# Patient Record
Sex: FEMALE | Race: WHITE | Employment: OTHER | URBAN - METROPOLITAN AREA
[De-identification: names, ages, dates, MRNs, and addresses within clinical notes are randomized per-mention and may not be internally consistent; named-entity substitution may affect disease eponyms.]

---

## 2021-09-14 ENCOUNTER — TRANSCRIBE ORDER (OUTPATIENT)
Dept: SCHEDULING | Age: 71
End: 2021-09-14

## 2021-09-14 DIAGNOSIS — Z78.0 ASYMPTOMATIC MENOPAUSAL STATE: Primary | ICD-10-CM

## 2021-09-14 DIAGNOSIS — Z12.31 ENCOUNTER FOR SCREENING MAMMOGRAM FOR MALIGNANT NEOPLASM OF BREAST: ICD-10-CM

## 2021-10-01 ENCOUNTER — HOSPITAL ENCOUNTER (OUTPATIENT)
Dept: GENERAL RADIOLOGY | Age: 71
End: 2021-10-01
Attending: NURSE PRACTITIONER
Payer: MEDICARE

## 2021-10-08 ENCOUNTER — APPOINTMENT (OUTPATIENT)
Dept: GENERAL RADIOLOGY | Age: 71
End: 2021-10-08
Attending: NURSE PRACTITIONER
Payer: MEDICARE

## 2021-10-08 ENCOUNTER — HOSPITAL ENCOUNTER (OUTPATIENT)
Dept: MAMMOGRAPHY | Age: 71
Discharge: HOME | End: 2021-10-08
Attending: NURSE PRACTITIONER

## 2021-10-08 ENCOUNTER — HOSPITAL ENCOUNTER (OUTPATIENT)
Dept: GENERAL RADIOLOGY | Age: 71
Discharge: HOME | End: 2021-10-08
Attending: NURSE PRACTITIONER
Payer: MEDICARE

## 2021-10-08 VITALS — WEIGHT: 160 LBS | BODY MASS INDEX: 29.26 KG/M2

## 2021-10-08 DIAGNOSIS — Z78.0 ASYMPTOMATIC MENOPAUSAL STATE: ICD-10-CM

## 2021-10-08 DIAGNOSIS — Z12.31 ENCOUNTER FOR SCREENING MAMMOGRAM FOR MALIGNANT NEOPLASM OF BREAST: ICD-10-CM

## 2021-10-08 PROCEDURE — 77063 BREAST TOMOSYNTHESIS BI: CPT

## 2021-10-08 PROCEDURE — 77080 DXA BONE DENSITY AXIAL: CPT

## 2022-03-10 ENCOUNTER — TELEPHONE (OUTPATIENT)
Dept: NEUROLOGY | Age: 72
End: 2022-03-10

## 2022-03-10 NOTE — TELEPHONE ENCOUNTER
Pt states she received a call from our office yesterday to set up NP appt. I got her scheduled for 8/29. Pt wants to know what she needs to be seen for. Please locate paperwork faxed from Dr. Doris Tavarez and call back to explain. [phone]    Sending to all psr's since pt did not know who called her.

## 2022-03-16 ENCOUNTER — OFFICE VISIT (OUTPATIENT)
Dept: NEUROLOGY | Age: 72
End: 2022-03-16
Payer: MEDICARE

## 2022-03-16 DIAGNOSIS — M54.12 CERVICAL RADICULOPATHY: ICD-10-CM

## 2022-03-16 PROCEDURE — 95913 NRV CNDJ TEST 13/> STUDIES: CPT | Performed by: PSYCHIATRY & NEUROLOGY

## 2022-03-16 PROCEDURE — 95886 MUSC TEST DONE W/N TEST COMP: CPT | Performed by: PSYCHIATRY & NEUROLOGY

## 2022-03-16 NOTE — PROCEDURES
ELECTRODIAGNOSTIC REPORT      Test Date:  3/16/2022    Patient: Angel Bliss : 1950 Physician: Dr. Kamlesh Kamara   ID#: [id number] SEX: Female Ref. Phys: Dr. Marilou Hill     Patient History / Exam:  The patient is a pleasant 70year old female who presents with sensory disturbance and pain in her bilateral upper extremities. She has a history of prior cervical spine injury. Strength was 5/5 throughout. EMG & NCV Findings:    Nerve conduction studies as listed below in the bilateral upper extremities  were within reference of normal.    Disposable concentric needle examination of the muscles listed below revealed no evidence of active denervation. There were chronic neurogenic appearing motor units in the left deltoid, biceps, and bilateral triceps. Interpretation: This study was abnormal.  There was  electrodiagnostic evidence upon todays examination suggestin. A chronic mild cervical radiculopathy with no evidence of active denervation. 2. There was no evidence of  a focal or generalized large fiber neuropathy or myopathy      ___________________________  Michelle ANDINO   FAAN      Nerve Conduction Studies  Anti Sensory Summary Table     Stim Site NR Onset (ms) Peak (ms) O-P Amp (µV) Norm Peak (ms) Norm O-P Amp Site1 Site2 Dist (cm) Norm Loyd (m/s)   Left Median Anti Sensory (2nd Digit)  32.9°C   Wrist    2.3 2.9 12.3 <4 >11 Wrist 2nd Digit 14.0    Right Median Anti Sensory (2nd Digit)  32.9°C   Wrist    2.4 3.1 24.0 <4 >11 Wrist 2nd Digit 14.0    Left Radial Anti Sensory (Base 1st Digit)  32.9°C   Wrist    1.4 2.2 15.8 <2.9 >15 Wrist Base 1st Digit 10.0    Right Radial Anti Sensory (Base 1st Digit)  32.9°C   Wrist    1.8 2.4 19.0 <2.9 >15 Wrist Base 1st Digit 10.0    Left Ulnar Anti Sensory (5th Digit)  32.9°C   Wrist    2.2 2.9 13.3 <4.0 >10 Wrist 5th Digit 14.0    Right Ulnar Anti Sensory (5th Digit)  32.9°C   Wrist    1.9 2.6 17.3 <4.0 >10 Wrist 5th Digit 14.0      Motor Summary Table     Stim Site NR Onset (ms) Norm Onset (ms) O-P Amp (mV) Norm O-P Amp P-T Amp (mV) Site1 Site2 Dist (cm) Loyd (m/s)   Left Median Motor (Abd Poll Brev)  32.9°C   Wrist    3.4 <4.5 4.1 >4.1  Wrist Abd Poll Brev 8.0    Elbow    7.0  3.7   Elbow Wrist 19.0 53   Right Median Motor (Abd Poll Brev)  32.9°C   Wrist    3.7 <4.5 4.2 >4.1  Wrist Abd Poll Brev 8.0    Elbow    7.1  4.3   Elbow Wrist 17.0 50   Left Ulnar Motor (Abd Dig Minimi)  32.9°C   Wrist    2.8 <3.1 7.1 >7.0  Wrist Abd Dig Minimi 8.0    B Elbow    5.8  6.9   B Elbow Wrist 19.0 63   A Elbow    7.7  6.8   A Elbow B Elbow 10.0 53   Right Ulnar Motor (Abd Dig Minimi)  32.9°C   Wrist    3.0 <3.1 7.7 >7.0  Wrist Abd Dig Minimi 8.0    B Elbow    6.0  7.6   B Elbow Wrist 18.0 60   A Elbow    7.7  7.2   A Elbow B Elbow 10.0 59     Comparison Summary Table     Stim Site NR Peak (ms) P-T Amp (µV) Site1 Site2 Dist (cm) Delta-P (ms)   Left Median/Ulnar Palm Comparison (Wrist)  32.9°C   Median Palm    1.8 25.6 Median Palm Ulnar Palm 8.0 0.1   Ulnar Palm    1.7 9.5       Right Median/Ulnar Palm Comparison (Wrist)  32.9°C   Median Palm    2.1 28.6 Median Palm Ulnar Palm 8.0 0.3   Ulnar Palm    1.8 19.9           EMG     Side Muscle Nerve Root Ins Act Fibs Psw Recrt Duration Amp Poly Comment   Left Deltoid Axillary C5-6 Nml Nml Nml Reduced Nml Incr Nml    Left Triceps Radial C6-7-8 Nml Nml Nml Reduced Nml Nml Nml    Left Biceps Musculocut C5-6 Nml Nml Nml Reduced Nml Incr Nml    Left 1stDorInt Ulnar C8-T1 Nml Nml Nml Nml Nml Nml Nml    Left Abd Poll Brev Median C8-T1 Nml Nml Nml Nml Nml Nml Nml    Right Deltoid Axillary C5-6 Nml Nml Nml Nml Nml Incr Nml    Right Triceps Radial C6-7-8 Nml Nml Nml Reduced Nml Nml Nml    Right Biceps Musculocut C5-6 Nml Nml Nml Nml Nml Nml 1+    Right 1stDorInt Ulnar C8-T1 Nml Nml Nml Nml Nml Nml Nml    Right Abd Poll Brev Median C8-T1 Nml Nml Nml Nml Nml Nml Nml    Right Upper Cerv Parasp Rami C5,6 Nml Nml Nml Nml Nml Nml Nml Waveforms:

## 2022-03-22 ENCOUNTER — HOSPITAL ENCOUNTER (OUTPATIENT)
Dept: GENERAL RADIOLOGY | Age: 72
Discharge: HOME | End: 2022-03-22
Payer: MEDICARE

## 2022-03-22 ENCOUNTER — TRANSCRIBE ORDER (OUTPATIENT)
Dept: REGISTRATION | Age: 72
End: 2022-03-22

## 2022-03-22 DIAGNOSIS — M54.2 CERVICALGIA: Primary | ICD-10-CM

## 2022-03-22 DIAGNOSIS — M54.2 CERVICALGIA: ICD-10-CM

## 2022-03-22 PROCEDURE — 72050 X-RAY EXAM NECK SPINE 4/5VWS: CPT

## 2022-03-24 ENCOUNTER — TRANSCRIBE ORDER (OUTPATIENT)
Dept: SCHEDULING | Age: 72
End: 2022-03-24

## 2022-03-24 DIAGNOSIS — M79.641 RIGHT HAND PAIN: Primary | ICD-10-CM

## 2022-03-24 DIAGNOSIS — M79.642 LEFT HAND PAIN: ICD-10-CM

## 2022-03-24 DIAGNOSIS — M54.2 CERVICALGIA: ICD-10-CM

## 2022-03-30 ENCOUNTER — HOSPITAL ENCOUNTER (OUTPATIENT)
Dept: MRI IMAGING | Age: 72
Discharge: HOME | End: 2022-03-30
Attending: ORTHOPAEDIC SURGERY
Payer: MEDICARE

## 2022-03-30 DIAGNOSIS — M54.2 CERVICALGIA: ICD-10-CM

## 2022-03-30 DIAGNOSIS — M79.641 RIGHT HAND PAIN: ICD-10-CM

## 2022-03-30 DIAGNOSIS — M79.642 LEFT HAND PAIN: ICD-10-CM

## 2022-03-30 PROCEDURE — 72141 MRI NECK SPINE W/O DYE: CPT

## 2022-06-07 ENCOUNTER — APPOINTMENT (OUTPATIENT)
Dept: GENERAL RADIOLOGY | Age: 72
DRG: 472 | End: 2022-06-07
Attending: ORTHOPAEDIC SURGERY
Payer: MEDICARE

## 2022-06-07 ENCOUNTER — HOSPITAL ENCOUNTER (OUTPATIENT)
Dept: PREADMISSION TESTING | Age: 72
Discharge: HOME | DRG: 472 | End: 2022-06-07
Payer: MEDICARE

## 2022-06-07 VITALS
RESPIRATION RATE: 16 BRPM | TEMPERATURE: 98.1 F | BODY MASS INDEX: 27.38 KG/M2 | SYSTOLIC BLOOD PRESSURE: 135 MMHG | WEIGHT: 148.81 LBS | HEART RATE: 80 BPM | HEIGHT: 62 IN | OXYGEN SATURATION: 100 % | DIASTOLIC BLOOD PRESSURE: 66 MMHG

## 2022-06-07 LAB
ABO + RH BLD: NORMAL
ALBUMIN SERPL-MCNC: 4.2 G/DL (ref 3.5–5)
ALBUMIN/GLOB SERPL: 1 {RATIO} (ref 1.1–2.2)
ALP SERPL-CCNC: 69 U/L (ref 45–117)
ALT SERPL-CCNC: 27 U/L (ref 12–78)
AMPHET UR QL SCN: NEGATIVE
ANION GAP SERPL CALC-SCNC: 2 MMOL/L (ref 5–15)
APPEARANCE UR: CLEAR
AST SERPL-CCNC: 17 U/L (ref 15–37)
ATRIAL RATE: 78 BPM
BACTERIA URNS QL MICRO: ABNORMAL /HPF
BARBITURATES UR QL SCN: NEGATIVE
BENZODIAZ UR QL: NEGATIVE
BILIRUB SERPL-MCNC: 0.3 MG/DL (ref 0.2–1)
BILIRUB UR QL: NEGATIVE
BLOOD GROUP ANTIBODIES SERPL: NORMAL
BUN SERPL-MCNC: 35 MG/DL (ref 6–20)
BUN/CREAT SERPL: 36 (ref 12–20)
CALCIUM SERPL-MCNC: 9.6 MG/DL (ref 8.5–10.1)
CALCULATED P AXIS, ECG09: 76 DEGREES
CALCULATED R AXIS, ECG10: -4 DEGREES
CALCULATED T AXIS, ECG11: 32 DEGREES
CANNABINOIDS UR QL SCN: NEGATIVE
CHLORIDE SERPL-SCNC: 107 MMOL/L (ref 97–108)
CO2 SERPL-SCNC: 31 MMOL/L (ref 21–32)
COCAINE UR QL SCN: NEGATIVE
COLOR UR: ABNORMAL
CREAT SERPL-MCNC: 0.96 MG/DL (ref 0.55–1.02)
DIAGNOSIS, 93000: NORMAL
DRUG SCRN COMMENT,DRGCM: NORMAL
EPITH CASTS URNS QL MICRO: ABNORMAL /LPF
ERYTHROCYTE [DISTWIDTH] IN BLOOD BY AUTOMATED COUNT: 13.5 % (ref 11.5–14.5)
GLOBULIN SER CALC-MCNC: 4.4 G/DL (ref 2–4)
GLUCOSE SERPL-MCNC: 123 MG/DL (ref 65–100)
GLUCOSE UR STRIP.AUTO-MCNC: NEGATIVE MG/DL
HCT VFR BLD AUTO: 36.7 % (ref 35–47)
HGB BLD-MCNC: 12.4 G/DL (ref 11.5–16)
HGB UR QL STRIP: NEGATIVE
HYALINE CASTS URNS QL MICRO: ABNORMAL /LPF (ref 0–2)
INR PPP: 1 (ref 0.9–1.1)
KETONES UR QL STRIP.AUTO: NEGATIVE MG/DL
LEUKOCYTE ESTERASE UR QL STRIP.AUTO: NEGATIVE
MCH RBC QN AUTO: 30.1 PG (ref 26–34)
MCHC RBC AUTO-ENTMCNC: 33.8 G/DL (ref 30–36.5)
MCV RBC AUTO: 89.1 FL (ref 80–99)
METHADONE UR QL: NEGATIVE
NITRITE UR QL STRIP.AUTO: NEGATIVE
NRBC # BLD: 0 K/UL (ref 0–0.01)
NRBC BLD-RTO: 0 PER 100 WBC
OPIATES UR QL: NEGATIVE
P-R INTERVAL, ECG05: 186 MS
PCP UR QL: NEGATIVE
PH UR STRIP: 5 [PH] (ref 5–8)
PLATELET # BLD AUTO: 252 K/UL (ref 150–400)
PMV BLD AUTO: 10.1 FL (ref 8.9–12.9)
POTASSIUM SERPL-SCNC: 3.6 MMOL/L (ref 3.5–5.1)
PROT SERPL-MCNC: 8.6 G/DL (ref 6.4–8.2)
PROT UR STRIP-MCNC: NEGATIVE MG/DL
PROTHROMBIN TIME: 10.8 SEC (ref 9–11.1)
Q-T INTERVAL, ECG07: 384 MS
QRS DURATION, ECG06: 86 MS
QTC CALCULATION (BEZET), ECG08: 437 MS
RBC # BLD AUTO: 4.12 M/UL (ref 3.8–5.2)
RBC #/AREA URNS HPF: ABNORMAL /HPF (ref 0–5)
SODIUM SERPL-SCNC: 140 MMOL/L (ref 136–145)
SP GR UR REFRACTOMETRY: 1.02
SPECIMEN EXP DATE BLD: NORMAL
UA: UC IF INDICATED,UAUC: ABNORMAL
UROBILINOGEN UR QL STRIP.AUTO: 0.2 EU/DL (ref 0.2–1)
VENTRICULAR RATE, ECG03: 78 BPM
WBC # BLD AUTO: 8.3 K/UL (ref 3.6–11)
WBC URNS QL MICRO: ABNORMAL /HPF (ref 0–4)

## 2022-06-07 PROCEDURE — 36415 COLL VENOUS BLD VENIPUNCTURE: CPT

## 2022-06-07 PROCEDURE — 71046 X-RAY EXAM CHEST 2 VIEWS: CPT

## 2022-06-07 PROCEDURE — 85610 PROTHROMBIN TIME: CPT

## 2022-06-07 PROCEDURE — 85027 COMPLETE CBC AUTOMATED: CPT

## 2022-06-07 PROCEDURE — 97161 PT EVAL LOW COMPLEX 20 MIN: CPT | Performed by: PHYSICAL THERAPIST

## 2022-06-07 PROCEDURE — 97530 THERAPEUTIC ACTIVITIES: CPT | Performed by: PHYSICAL THERAPIST

## 2022-06-07 PROCEDURE — 93005 ELECTROCARDIOGRAM TRACING: CPT

## 2022-06-07 PROCEDURE — 86900 BLOOD TYPING SEROLOGIC ABO: CPT

## 2022-06-07 PROCEDURE — 80307 DRUG TEST PRSMV CHEM ANLYZR: CPT

## 2022-06-07 PROCEDURE — 84134 ASSAY OF PREALBUMIN: CPT

## 2022-06-07 PROCEDURE — 83036 HEMOGLOBIN GLYCOSYLATED A1C: CPT

## 2022-06-07 PROCEDURE — 82306 VITAMIN D 25 HYDROXY: CPT

## 2022-06-07 PROCEDURE — 81001 URINALYSIS AUTO W/SCOPE: CPT

## 2022-06-07 PROCEDURE — 80053 COMPREHEN METABOLIC PANEL: CPT

## 2022-06-07 RX ORDER — MENTHOL
1000 GEL (GRAM) TOPICAL DAILY
COMMUNITY

## 2022-06-07 RX ORDER — ZINC GLUCONATE 10 MG
1 LOZENGE ORAL DAILY
COMMUNITY

## 2022-06-07 RX ORDER — CYCLOBENZAPRINE HCL 10 MG
10 TABLET ORAL
COMMUNITY

## 2022-06-07 RX ORDER — MONTELUKAST SODIUM 10 MG/1
10 TABLET ORAL DAILY
COMMUNITY

## 2022-06-07 RX ORDER — ALENDRONATE SODIUM 70 MG/1
70 TABLET ORAL
COMMUNITY

## 2022-06-07 RX ORDER — PHENOL/SODIUM PHENOLATE
20 AEROSOL, SPRAY (ML) MUCOUS MEMBRANE DAILY
COMMUNITY

## 2022-06-07 RX ORDER — TRAMADOL HYDROCHLORIDE 50 MG/1
50 TABLET ORAL
COMMUNITY
End: 2022-06-15

## 2022-06-07 RX ORDER — ERGOCALCIFEROL 1.25 MG/1
50000 CAPSULE ORAL
COMMUNITY

## 2022-06-07 RX ORDER — MELATONIN 2.5MG/10ML
1 LIQUID (ML) ORAL DAILY
COMMUNITY

## 2022-06-07 RX ORDER — CETIRIZINE HCL 10 MG
10 TABLET ORAL DAILY
COMMUNITY

## 2022-06-07 RX ORDER — LISINOPRIL AND HYDROCHLOROTHIAZIDE 10; 12.5 MG/1; MG/1
1 TABLET ORAL DAILY
COMMUNITY

## 2022-06-07 RX ORDER — MELOXICAM 15 MG/1
15 TABLET ORAL DAILY
COMMUNITY
End: 2022-06-15

## 2022-06-07 NOTE — PROGRESS NOTES
[facility]  Physical Therapy Pre-surgery evaluation  [address], [address]    PHYSICAL THERAPY PRE SPINE SURGERY EVALUATION  Patient: Vivek Arteaga (68 y.o. 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 Patient reports injury to spine in 1991 and numbness that started in fingers in 2001. In Feb 2022 patient began to have increased numbness in hands and decreased fine motor coordination which is significantly impacting her ADLs. Gait is also unsteady demonstrating path deviations and fair overall balance. Discussed anticipated disposition to home with possible discharge within a 1 to 2 day time frame post-surgery. Patient and  in agreement. Patient appear anxious regarding upcoming surgery with many questions regarding recovery. Will likely benefit from hand therapy following discharge     Anticipate patient will need acute PT and OT orders based on current and anticipate deficits post surgery. GOALS: (Goals have been discussed and agreed upon with patient.)  DISCHARGE GOALS: Time Frame: 1 DAY  1. Patient will demonstrate increased strength, range of motion, and pain control via a home exercise program in order to minimize functional deficits in preparation for their upcoming surgery.  This will be achieved by using education, demonstration  and through the use of an informational handout including a home exercise program.  REHABILITATION POTENTIAL FOR STATED GOALS: Good     RECOMMENDATIONS AND PLANNED INTERVENTIONS: (Benefits and precautions of physical therapy have been discussed with the patient.)  · Home Exercise Program  TREATMENT PLAN EFFECTIVE DATES: 6/7/2022 to 6/7/2022  FREQUENCY/DURATION: Patient to continue to perform home exercise program at least twice daily until surgery. SUBJECTIVE:   Patient stated For the longest time I thought my numbness was from carpal tunnel.     OBJECTIVE DATA SUMMARY:   HISTORY:      Past Medical History:   Diagnosis Date    Menopause      Past Surgical History:   Procedure Laterality Date    HX HYSTERECTOMY      HX OOPHORECTOMY         Prior Level of Function/Home Situation: patient reports independence with overall mobility; she reports difficulty with fine motor coordination which significantly impacts her ability to dress herself, cut food and hold items. Home Situation  Home Environment: Private residence  # Steps to Enter: 3  Rails to Enter: Yes  Hand Rails : Left  One/Two Story Residence: One story  Living Alone: No  Support Systems: Spouse/Significant Other  Patient Expects to be Discharged to[de-identified] Home with family assistance  Current DME Used/Available at Home: Alline Lowe, rollator  Tub or Shower Type: Tub/Shower combination    EXAMINATION/PRESENTATION/DECISION MAKING:     ADLs (Current Functional Status): Bathing/Showering:   [x] Independent  [] Requires Assistance from Someone  [] Sponge Bath Only   Ambulation:  [x] Independent  [] Walk Indoors Only  [] Walk Outdoors  [] Use Assistive Device  [] Use Wheelchair Only     Dressing:  [] [address] from Someone for:  [] Sock/Shoes  [] Pants  [x] Everything   Household Activities:  [] Routine house and yard work  [x] Light Housework Only  [] None         Critical Behavior:  Neurologic State: Alert,Appropriate for age  Orientation Level: Oriented X4          Strength:    Strength:  Within functional limits                        Tone & Sensation:   Tone: Normal              Sensation: Impaired (im B hands L>R)                   Range Of Motion:  AROM: Within functional limits Coordination:  Coordination: Grossly decreased, non-functional (for fine motor ADLs)    Functional Mobility:  Transfers:  Sit to Stand: Modified independent  Stand to Sit: Modified independent                       Balance:   Sitting: Intact  Standing: Impaired  Standing - Static: Good,Unsupported  Standing - Dynamic : Fair,Constant support  Ambulation/Gait Training:  Distance (ft): 100 Feet (ft)  Assistive Device:  (none)  Ambulation - Level of Assistance: Supervision        Gait Abnormalities: Decreased step clearance,Path deviations,Trunk sway increased        Base of Support: Widened     Speed/Neida: Pace decreased (<100 feet/min),Slow  Step Length: Left shortened,Right shortened  Swing Pattern:  (decreased knee flexion during swing thru B)         gait is unsteady demonstrating path deviations and guarded overall gait with decreased knee flexion bilaterally                  Functional Measure:  10 Meter walk test:  (Specify if any supplemental oxygen is used, the type, pre, during and post sats.)       Trial 1 (Time to Walk 10 Meters): 10.56 Seconds  Trial 2 (Time to Walk 10 Meters): 11.91 Seconds  Trial 3 (Time to Walk 10 Meters): 10.95 Seconds  Average : 11.1 Seconds  Score (Meters/Second): 0.9 Meters/Second             Walking Speed (m/s)  Modifier Scale Age 52-63 Age 61-76 Age 66-77 Age 80-80    Male Female Male Female Male Female Male Female   CH   0% Impaired ?  1.39 ? 1.40 ? 1.36 ? 1.30 ? 1.33 ? 1.27 ? 1.21 ? 1.15   CI   1-19% Impaired 1.11-1.38 1.12-1.39 1.09-1.35 1.04-1.29 1.06-1.32 1.01-1.26 0.96-1.20 0.92-1.14   CJ   20-39% Impaired 0.83-1.10 0.84-1.11 0.82-1.08 0.78-1.03 0.80-1.05 0.76-1.00 0.72-0.95 0.69-0.91   CK   40-59% Impaired 0.56-0.82 0.57-0.83 0.54-0.81 0.52-0.77 0.53-0.79 0.51-0.75 0.48-0.71 0.46-0.68   CL   60-79% Impaired 0.28-0.55 0.28-0.56 0.27-0.53 0.26-0.51 0.27-0.52 0.25-0.50 0.24-0.49 0.23-0.45   CM   80-99% Impaired 0.01-0.28 < 0.01-0.28 < 0.01-0.27 < 0.01-0.26 0.01-0.27 0. 01-0.24 0.01-0.23 0.01-0.22   CN   100% Impaired Cannot Perform   Minimal Detectable Change (MDC-90) = 0.1 m/s  Basia GREENE. \"Comfortable and maximum walking speed of adults aged 20-79 years: reference values and determinants. \" Age and Agin Volume 26(1):15-9. Nori Saenz. \"Age- and gender-related test performance in community-dwelling elderly people: Six-Minute Walk Test, Jacobs Balance Scale, Timed Up & Go Test, and gait speeds. \" Physical Therapy: 2002 Volume 82(2):128-37. Christelle Lau DM, Coral PW, Jai Story JD, [facility] CHRISTO. \"Assessing stability and change of four performance measures: a longitudinal study evaluating outcome following total hip and knee arthroplasty. \" [facility] Musculoskeletal Disorders: 2005 Volume 6(3). Maricarmen Thorpe, PhD; James Joseph, PhD. [facility] Paper: \"Walking Speed: the Sixth Vital Sign\" Journal of Geriatric Physical Therapy: 2009 - Volume 32 - Issue 2 - p 2-5 . Pain:  Pain Scale 1: Numeric (0 - 10)  Pain Intensity 1: 6  Pain Location 1: Hand  Pain Orientation 1: Left;Right  Pain Description 1: Constant;Tightness       Radicular Pain:   None    Activity Tolerance:   good   Patient []   does  [x]   does not demonstrate signs/symptoms of shortness of breath/dyspnea on exertion/respiratory distress. COMMUNICATION/EDUCATION:   The patient was educated on:  [x]         Early post operative mobility is imperative to achieve a patient's desired outcomes and to restore biological function. [x]         Post operative spinal precautions may/may not be applicable. These precautions are based on the patient's physician and the procedure(s) performed.     [x]         Spinal precautions including:  ·   No bending forward, sideways, or backwards  ·   No twisting   ·   No lifting more than 5-10 pounds  ·   No sitting longer than 30-60 minutes at a time  ·   Cervical aspen collar should be on at all times    The patients plan of care was discussed as follows:   [x]         The patient verbalized understanding of her plan in preparation for their upcoming surgery  [x]         The patient's  was present for this session  []        The patient reports that he/she does not have a  identified at this time  [x]         The  verbalized understanding of the education regarding the patient's upcoming surgery  [x]         Patient/family agree to work toward stated goals and plan of care. []         Patient understands intent and goals of therapy, but is neutral about his/her participation. []         Patient is unable to participate in goal setting and plan of care.       Thank you for this referral.  Bhavani Lancaster, PT    Time Calculation: 37 mins

## 2022-06-07 NOTE — PERIOP NOTES
Orthopedic and Spine Patients: Instructions on When You Can   Eat or Drink Before Surgery      You have been provided 2 pre-surgery drinks received at your pre-admission testing appointment.  Night before surgery:  o You should drink one bottle of the  pre-surgery drink at bedtime. No food after midnight!  Day of Surgery:  o Complete 2nd bottle of the pre-surgery drink 1 hour prior to arrival at hospital.  For questions call Pre-Admission Testing at [phone]. They are available from 8:00am-5:00pm, Monday through Friday.

## 2022-06-07 NOTE — PERIOP NOTES
Incentive Spirometer        Using the incentive spirometer helps expand the small air sacs of your lungs, helps you breathe deeply, and helps improve your lung function. Use your incentive spirometer twice a day (10 breaths each time) prior to surgery. How to Use Your Incentive Spirometer:  1. Hold the incentive spirometer in an upright position. 2. Breathe out as usual.   3. Place the mouthpiece in your mouth and seal your lips tightly around it. 4. Take a deep breath. Breathe in slowly and as deeply as possible. Keep the blue flow rate guide between the arrows. 5. Hold your breath as long as possible. Then exhale slowly and allow the piston to fall to the bottom of the column. 6. Rest for a few seconds and repeat steps one through five at least 10 times. PAT Tidal Volume_____1750_____  x____2____  Date______6/7/22______    Clarence Villarreal THE INCENTIVE SPIROMETER WITH YOU TO THE HOSPITAL ON THE DAY OF YOUR SURGERY. Opportunity given to ask and answer questions as well as to observe return demonstration.       Patient signature_____________________________          Witness____________________________

## 2022-06-07 NOTE — PERIOP NOTES
[facility]  \"We've Got Your Back! Caring For Yourself Before and After Spine Surgery\" handbook was provided & reviewed with the patient during the pre-admission testing (PAT) appointment. An opportunity for questions was provided, patient verbalized understanding.

## 2022-06-07 NOTE — PERIOP NOTES
[facility]  Joint/Spine Preoperative Instructions        Surgery Date Tuesday, June 14th          Time of Arrival TBD/TBC @ [phone]    1. On the day of your surgery, please report to the Surgical Services Registration Desk and sign in at your designated time. The Surgery Center is located to the right of the Emergency Room. 2. You must have someone with you to drive you home. You should not drive a car for 24 hours following surgery. Please make arrangements for a friend or family member to stay with you for the first 24 hours after your surgery. 3. No food after midnight Monday, June 13th. Medications morning of surgery should be taken with a sip of water. Please follow pre-surgery drink instructions that were given at your Pre Admission Testing appointment. 4. We recommend you do not drink any alcoholic beverages for 24 hours before and after your surgery. 5. Contact your surgeons office for instructions on the following medications: non-steroidal anti-inflammatory drugs (i.e. Advil, Aleve), vitamins, and supplements. (Some surgeons will want you to stop these medications prior to surgery and others may allow you to take them)  **If you are currently taking Plavix, Coumadin, Aspirin and/or other blood-thinning agents, contact your surgeon for instructions. ** Your surgeon will partner with the physician prescribing these medications to determine if it is safe to stop or if you need to continue taking. Please do not stop taking these medications without instructions from your surgeon    6. Wear comfortable clothes. Wear glasses instead of contacts. Do not bring any money or jewelry. Please bring picture ID, insurance card, and any prearranged co-payment or hospital payment. Do not wear make-up, particularly mascara the morning of your surgery. Do not wear nail polish, particularly if you are having foot /hand surgery.   Wear your hair loose or down, no ponytails, buns, latonya pins or clips. All body piercings must be removed. Please shower with antibacterial soap for three consecutive days before and on the morning of surgery, but do not apply any lotions, powders or deodorants after the shower on the day of surgery. Please use a fresh towels after each shower. Please sleep in clean clothes and change bed linens the night before surgery. Please do not shave for 48 hours prior to surgery. Shaving of the face is acceptable. 7. You should understand that if you do not follow these instructions your surgery may be cancelled. If your physical condition changes (I.e. fever, cold or flu) please contact your surgeon as soon as possible. 8. It is important that you be on time. If a situation occurs where you may be late, please call [phone] (OR Holding Area). 9. If you have any questions and or problems, please call [phone] (Pre-admission Testing). 10. Your surgery time may be subject to change. You will receive a phone call the evening prior. 11.  If having outpatient surgery, you must have someone to drive you here, stay with you during the duration of your stay, and to drive you home at time of discharge. 12. The following link is for the educational video for patients and/or families. [email or web address]-zapata.org/. [email or web address]-tqfmrvp-[email or web address]-materials      Special Instructions: TAKE ALL MEDICATIONS THE DAY OF SURGERY EXCEPT: Follow your physician/surgeon instructions for holding all non-steroidal anti-inflammatory drugs/NSAIDs, blood-thinning agents, vitamins & supplements prior to surgery. I understand a pre-operative phone call will be made to verify my surgery time. In the event that I am not available, I give permission for a message to be left on my answering service and/or with another person?   yes         ___________________      __________   _________ (Signature of Patient)             (Witness)                (Date and Time)

## 2022-06-07 NOTE — PERIOP NOTES
Hibiclens/Chlorhexidine    Preventing Infections Before and After - Your Surgery    IMPORTANT INSTRUCTIONS    Please read and follow these instructions carefully. If you are unable to comply with the below instructions your procedure will be cancelled. Every Night for Three (3) nights before your surgery:  1. Shower with an antibacterial soap, such as Dial, or the soap provided at your preassessment appointment. A shower is better than a bath for cleaning your skin. 2. If needed, ask someone to help you reach all areas of your body. Dont forget to clean your belly button with every shower. The night before your surgery: If you lose your Hibiclens/chlorhexidine please contact surgery center or you can purchase it at a local pharmacy  1. On the night before your surgery, shower with an antibacterial soap, such as Dial, or the soap provided at your preassessment appointment. 2. With one packet of Hibiclens/Chlorhexidine in hand, turn water off.  3. Apply Hibiclens antiseptic skin cleanser with a clean, freshly washed washcloth. ? Gently apply to your body from chin to toes (except the genital area) and especially the area(s) where your incision(s) will be. ? Leave Hibiclens/Chlorhexidine on your skin for at least 20 seconds. CAUTION: If needed, Hibiclens/chlorhexidine may be used to clean the folds of skin of the legs (such as in the area of the groin) and on your buttocks and hips. However, do not use Hibiclens/Chlorhexidine above the neck or in the genital area (your bottom) or put inside any area of your body. 4. Turn the water back on and rinse. 5. Dry gently with a clean, freshly washed towel. 6. After your shower, do not use any powder, deodorant, perfumes or lotion. 7. Use clean, freshly washed towels and washcloths every time you shower. 8. Wear clean, freshly washed pajamas to bed the night before surgery. 9. Sleep on clean, freshly washed sheets.   10. Do not allow pets to sleep in your bed with you. The Morning of your surgery:  1. Shower again thoroughly with an antibacterial soap, such as Dial or the soap provided at your preassessment appointment. If needed, ask someone for help to reach all areas of your body. Dont forget to clean your belly button! Rinse. 2. Dry gently with a clean, freshly washed towel. 3. After your shower, do not use any powder, deodorant, perfumes or lotion prior to surgery. 4. Put on clean, freshly washed clothing. Tips to help prevent infections after your surgery:  1. Protect your surgical wound from germs:  ? Hand washing is the most important thing you and your caregivers can do to prevent infections. ? Keep your bandage clean and dry! ? Do not touch your surgical wound. 2. Use clean, freshly washed towels and washcloths every time you shower; do not share bath linens with others. 3. Until your surgical wound is healed, wear clothing and sleep on bed linens each day that are clean and freshly washed. 4. Do not allow pets to sleep in your bed with you or touch your surgical wound. 5. Do not smoke - smoking delays wound healing. This may be a good time to stop smoking. 6. If you have diabetes, it is important for you to manage your blood sugar levels properly before your surgery as well as after your surgery. Poorly managed blood sugar levels slow down wound healing and prevent you from healing completely. If you lose your Hibiclens/chlorhexidine, please call the [facility], or it is available for purchase at your pharmacy.                ___________________      ___________________      ________________  (Signature of Patient)          (Witness)                   (Date and Time)

## 2022-06-08 LAB
25(OH)D3 SERPL-MCNC: 84.8 NG/ML (ref 30–100)
BACTERIA SPEC CULT: NORMAL
BACTERIA SPEC CULT: NORMAL
EST. AVERAGE GLUCOSE BLD GHB EST-MCNC: 123 MG/DL
HBA1C MFR BLD: 5.9 % (ref 4–5.6)
PREALB SERPL-MCNC: 26.7 MG/DL (ref 20–40)
SERVICE CMNT-IMP: NORMAL

## 2022-06-09 RX ORDER — MUPIROCIN 20 MG/G
OINTMENT TOPICAL 2 TIMES DAILY
COMMUNITY
Start: 2022-06-09 | End: 2022-06-15

## 2022-06-09 RX ORDER — MUPIROCIN 20 MG/G
OINTMENT TOPICAL 2 TIMES DAILY
Qty: 22 G | Refills: 0 | Status: SHIPPED | OUTPATIENT
Start: 2022-06-09 | End: 2022-06-15

## 2022-06-09 NOTE — PERIOP NOTES
Called and instructed patient to  Mupirocin prescription called into preferred pharmacy. Also to start today. She verbalized understanding.

## 2022-06-09 NOTE — ADVANCED PRACTICE NURSE
PAT Nurse Practitioner   Pre-Operative Chart Review/Assessment:-ORTHOPEDIC/NEUROSURGICAL SPINE                Patient Name:  Caty Mendiola                                                           Age:   67 y.o.    :  1950     Today's Date:  2022     Date of PAT:   22      Date of Surgery:    2022      Procedure(s): Anterior Cervical Discectomy with Fusion C3-C6     Surgeon:   John Mcmillan     Medical Clearance:  Jose Maria Saucedo NP                   PLAN:      1)  Cardiac Clearance:  Not requested        2)  Program for Diabetes Health Consult:  Not indicated-A1C 5.9      3)  Sleep Apnea evaluation:   STOP BANG Score 2;  Snoring-denies, Apnea-denies               4) Treatment for MRSA/Staph Aureus:  + MSSA. Tx w/ Mupirocin ointment BID x 5 days to B nostrils starting 22      5) Additional Concerns:  Spinal stenosis, GERD, osteoporosis, fall hx                 Vital Signs:         Visit Vitals  /66 (BP 1 Location: Left upper arm, BP Patient Position: Sitting)   Pulse 80   Temp 98.1 °F (36.7 °C)   Resp 16   Ht 5' 2\" (1.575 m)   Wt 67.5 kg (148 lb 13 oz)   SpO2 100%   BMI 27.22 kg/m²                        ____________________________________________  PAST MEDICAL HISTORY  Past Medical History:   Diagnosis Date    GERD (gastroesophageal reflux disease)     Hypertension     Kidney stones     Menopause     Osteopenia     Osteoporosis     Spinal stenosis     Vitamin D deficiency       ____________________________________________  PAST SURGICAL HISTORY  Past Surgical History:   Procedure Laterality Date    HX HYSTERECTOMY      HX LITHOTRIPSY      x 2    HX OOPHORECTOMY        ____________________________________________  HOME MEDICATIONS    Current Outpatient Medications   Medication Sig    mupirocin (BACTROBAN) 2 % ointment by Both Nostrils route two (2) times a day for 5 days. No current facility-administered medications for this encounter. ____________________________________________  ALLERGIES  Allergies   Allergen Reactions    Calcium Swelling and Other (comments)     \"my mouth swells up and bleeds\"    Copper Itching      ____________________________________________  SOCIAL HISTORY  Social History     Tobacco Use    Smoking status: Never Smoker    Smokeless tobacco: Never Used   Substance Use Topics    Alcohol use: No      ____________________________________________  COVID VACCINATION STATUS:      Internal Administration   First Dose COVID-19, Moderna, Primary or Immunocompromised Series, MRNA, PF, 100mcg/0.5mL  08/20/2021   Second Dose COVID-19, Moderna, Primary or Immunocompromised Series, MRNA, PF, 100mcg/0.5mL  09/17/2021      Last COVID Lab No results found for: Aurelia Sun, RCV2CT, CVD2M, COV2, XPLCVT, [id number], [id number], COVV, Lamin Rodas, [id number] [address] [place]                   Labs:     Hospital Outpatient Visit on 06/07/2022   Component Date Value Ref Range Status    WBC 06/07/2022 8.3  3.6 - 11.0 K/uL Final    RBC 06/07/2022 4.12  3.80 - 5.20 M/uL Final    HGB 06/07/2022 12.4  11.5 - 16.0 g/dL Final    HCT 06/07/2022 36.7  35.0 - 47.0 % Final    MCV 06/07/2022 89.1  80.0 - 99.0 FL Final    MCH 06/07/2022 30.1  26.0 - 34.0 PG Final    MCHC 06/07/2022 33.8  30.0 - 36.5 g/dL Final    RDW 06/07/2022 13.5  11.5 - 14.5 % Final    PLATELET 99/30/0809 491  150 - 400 K/uL Final    MPV 06/07/2022 10.1  8.9 - 12.9 FL Final    NRBC 06/07/2022 0.0  0  WBC Final    ABSOLUTE NRBC 06/07/2022 0.00  0.00 - 0.01 K/uL Final    Special Requests: 06/07/2022 NO SPECIAL REQUESTS    Final    Culture result: 06/07/2022 MRSA NOT PRESENT. Apparent Staphylococus aureus (not MRSA noted). Final    Culture result: 06/07/2022 Screening of patient nares for MRSA is for surveillance purposes and, if positive, to facilitate isolation considerations in high risk settings.  It is not intended for automatic decolonization interventions per se as regimens are not sufficiently effective to warrant routine use. Final    Ventricular Rate 06/07/2022 78  BPM Final    Atrial Rate 06/07/2022 78  BPM Final    P-R Interval 06/07/2022 186  ms Final    QRS Duration 06/07/2022 86  ms Final    Q-T Interval 06/07/2022 384  ms Final    QTC Calculation (Bezet) 06/07/2022 437  ms Final    Calculated P Axis 06/07/2022 76  degrees Final    Calculated R Axis 06/07/2022 -4  degrees Final    Calculated T Axis 06/07/2022 32  degrees Final    Diagnosis 06/07/2022    Final                    Value:Normal sinus rhythm  Nonspecific ST abnormality  No previous ECGs available  Confirmed by DICK HealyV. ([id number]) on 6/7/2022 11:12:10 PM      Hemoglobin A1c 06/07/2022 5.9* 4.0 - 5.6 % Final    Comment: NEW METHOD  PLEASE NOTE NEW REFERENCE RANGE  (NOTE)  HbA1C Interpretive Ranges  <5.7              Normal  5.7 - 6.4         Consider Prediabetes  >6.5              Consider Diabetes      Est. average glucose 06/07/2022 123  mg/dL Final    INR 06/07/2022 1.0  0.9 - 1.1   Final    A single therapeutic range for Vit K antagonists may not be optimal for all indications - see June, 2008 issue of Chest, American College of Chest Physicians Evidence-Based Clinical Practice Guidelines, 8th Edition.     Prothrombin time 06/07/2022 10.8  9.0 - 11.1 sec Final    Color 06/07/2022 YELLOW/STRAW    Final    Color Reference Range: Straw, Yellow or Dark Yellow    Appearance 06/07/2022 CLEAR  CLEAR   Final    Specific gravity 06/07/2022 1.023    Final    pH (UA) 06/07/2022 5.0  5.0 - 8.0   Final    Protein 06/07/2022 Negative  NEG mg/dL Final    Glucose 06/07/2022 Negative  NEG mg/dL Final    Ketone 06/07/2022 Negative  NEG mg/dL Final    Bilirubin 06/07/2022 Negative  NEG   Final    Blood 06/07/2022 Negative  NEG   Final    Urobilinogen 06/07/2022 0.2  0.2 - 1.0 EU/dL Final    Nitrites 06/07/2022 Negative  NEG   Final    Leukocyte Esterase 06/07/2022 Negative  NEG   Final    UA:UC IF INDICATED 06/07/2022 CULTURE NOT INDICATED BY UA RESULT  CNI   Final    WBC 06/07/2022 0-4  0 - 4 /hpf Final    RBC 06/07/2022 0-5  0 - 5 /hpf Final    Epithelial cells 06/07/2022 MANY* FEW /lpf Final    Epithelial cell category consists of squamous cells and /or transitional urothelial cells. Renal tubular cells, if present, are separately identified as such.  Bacteria 06/07/2022 2+* NEG /hpf Final    Hyaline cast 06/07/2022 2-5  0 - 2 /lpf Final    Sodium 06/07/2022 140  136 - 145 mmol/L Final    Potassium 06/07/2022 3.6  3.5 - 5.1 mmol/L Final    Chloride 06/07/2022 107  97 - 108 mmol/L Final    CO2 06/07/2022 31  21 - 32 mmol/L Final    Anion gap 06/07/2022 2* 5 - 15 mmol/L Final    Glucose 06/07/2022 123* 65 - 100 mg/dL Final    BUN 06/07/2022 35* 6 - 20 MG/DL Final    Creatinine 06/07/2022 0.96  0.55 - 1.02 MG/DL Final    BUN/Creatinine ratio 06/07/2022 36* 12 - 20   Final    GFR est AA 06/07/2022 >60  >60 ml/min/1.73m2 Final    GFR est non-AA 06/07/2022 57* >60 ml/min/1.73m2 Final    Estimated GFR is calculated using the IDMS-traceable Modification of Diet in Renal Disease (MDRD) Study equation, reported for both  Americans (GFRAA) and non- Americans (GFRNA), and normalized to 1.73m2 body surface area. The physician must decide which value applies to the patient.  Calcium 06/07/2022 9.6  8.5 - 10.1 MG/DL Final    Bilirubin, total 06/07/2022 0.3  0.2 - 1.0 MG/DL Final    ALT (SGPT) 06/07/2022 27  12 - 78 U/L Final    AST (SGOT) 06/07/2022 17  15 - 37 U/L Final    Alk.  phosphatase 06/07/2022 69  45 - 117 U/L Final    Protein, total 06/07/2022 8.6* 6.4 - 8.2 g/dL Final    Albumin 06/07/2022 4.2  3.5 - 5.0 g/dL Final    Globulin 06/07/2022 4.4* 2.0 - 4.0 g/dL Final    A-G Ratio 06/07/2022 1.0* 1.1 - 2.2   Final    AMPHETAMINES 06/07/2022 Negative  NEG   Final    BARBITURATES 06/07/2022 Negative  NEG   Final    BENZODIAZEPINES 06/07/2022 Negative  NEG   Final    COCAINE 06/07/2022 Negative  NEG   Final    METHADONE 06/07/2022 Negative  NEG   Final    OPIATES 06/07/2022 Negative  NEG   Final    PCP(PHENCYCLIDINE) 06/07/2022 Negative  NEG   Final    THC (TH-CANNABINOL) 06/07/2022 Negative  NEG   Final    Drug screen comment 06/07/2022 (NOTE)   Final    Comment: This test is a screen for drugs of abuse in a medical setting only   (i.e., they are unconfirmed results and as such must not be used for   non-medical purposes e.g., employment testing, legal testing). Due to   its inherent nature, false positive (FP) and false negative (FN)   results may be obtained. Therefore, if necessary for medical care,   recommend confirmation of positive findings by GC/MS. The cutoff   values (i.e., the level at which this screening test becomes positive   for a given drug group) are:    Amphetamine/Methamphetamine: 300 ng/mL  Barbiturates:                200 ng/mL  Benzodiazepines:             200 ng/mL  Cocaine:                     150 ng/mL  Methadone:                   300 ng/mL  Opiates:                     300 ng/mL   Phencyclidine, PCP:           25 ng/mL  Marijuana, THC:               50 ng/mL    This screening test can identify the presence of the following drugs   when above the cutoff value; see list posted on the intranet. It can   be viewed by rustam                           ecting in sequence the following from the [address] home   page: [place]; [address], Resources; [facility], [facility], Physician Resources Q to Z; \"UDS (Urine Drug Screen   Automated) List of Detectable Drugs. \"     Or use web address:   http://[facility]/[place]/[facility]/Physician%20Resources/  UDS%20List%20of%20Detectable%20Drugs. pdf      Prealbumin 06/07/2022 26.7  20.0 - 40.0 mg/dL Final    Crossmatch Expiration 06/07/2022 06/17/2022,2359   Final    ABO/Rh(D) 06/07/2022 B NEGATIVE   Final    Antibody screen 06/07/2022 NEG   Final  Vitamin D 25-Hydroxy 06/07/2022 84.8  30 - 100 ng/mL Final    Comment: (NOTE)  Deficiency               <20 ng/mL  Insufficiency          20-30 ng/mL  Sufficient             ng/mL  Possible toxicity       >100 ng/mL    The Method used is Siemens Advia Centaur currently standardized to a   Center of Disease Control and Prevention (CDC) certified reference   [facility]. Samples containing fluorescein dye can produce falsely   elevated values when tested with the ADVIA Centaur Vitamin D Assay. It is recommended that results in the toxic range, >100 ng/mL, be   retested 72 hours post fluorescein exposure. XR Results (most recent):    Results from Hospital Encounter encounter on 06/07/22    XR CHEST PA LAT    Narrative  EXAM: XR CHEST PA LAT    INDICATION: Preop ACDF. History of GERD, osteopenia, osteoporosis, spinal  stenosis, hypertension. COMPARISON: None. FINDINGS: PA and lateral radiographs of the chest demonstrate clear lungs and  pleural margins. Cardiac size is normal. There is minimal-mild thoracic aortic  tortuosity with otherwise normal mediastinal and hilar contour. Mild thoracic  spine degenerative changes are shown as is moderate upper lumbar spine  spondylosis. Impression  No acute findings. Skin:   Denies open wounds, cuts, sores, rashes or other areas of concern in PAT assessment.         Flori Shen NP

## 2022-06-13 ENCOUNTER — ANESTHESIA EVENT (OUTPATIENT)
Dept: SURGERY | Age: 72
DRG: 472 | End: 2022-06-13
Payer: MEDICARE

## 2022-06-13 RX ORDER — SODIUM CHLORIDE 0.9 % (FLUSH) 0.9 %
5-40 SYRINGE (ML) INJECTION AS NEEDED
Status: CANCELLED | OUTPATIENT
Start: 2022-06-13

## 2022-06-13 RX ORDER — HYDROMORPHONE HYDROCHLORIDE 1 MG/ML
0.2 INJECTION, SOLUTION INTRAMUSCULAR; INTRAVENOUS; SUBCUTANEOUS
Status: CANCELLED | OUTPATIENT
Start: 2022-06-13

## 2022-06-13 RX ORDER — ONDANSETRON 2 MG/ML
4 INJECTION INTRAMUSCULAR; INTRAVENOUS AS NEEDED
Status: CANCELLED | OUTPATIENT
Start: 2022-06-13

## 2022-06-13 RX ORDER — FENTANYL CITRATE 50 UG/ML
25 INJECTION, SOLUTION INTRAMUSCULAR; INTRAVENOUS
Status: CANCELLED | OUTPATIENT
Start: 2022-06-13

## 2022-06-13 RX ORDER — SODIUM CHLORIDE 0.9 % (FLUSH) 0.9 %
5-40 SYRINGE (ML) INJECTION EVERY 8 HOURS
Status: CANCELLED | OUTPATIENT
Start: 2022-06-13

## 2022-06-14 ENCOUNTER — HOSPITAL ENCOUNTER (INPATIENT)
Age: 72
LOS: 1 days | Discharge: HOME | DRG: 472 | End: 2022-06-15
Attending: ORTHOPAEDIC SURGERY | Admitting: ORTHOPAEDIC SURGERY
Payer: MEDICARE

## 2022-06-14 ENCOUNTER — APPOINTMENT (OUTPATIENT)
Dept: GENERAL RADIOLOGY | Age: 72
DRG: 472 | End: 2022-06-14
Attending: ORTHOPAEDIC SURGERY
Payer: MEDICARE

## 2022-06-14 ENCOUNTER — ANESTHESIA (OUTPATIENT)
Dept: SURGERY | Age: 72
DRG: 472 | End: 2022-06-14
Payer: MEDICARE

## 2022-06-14 DIAGNOSIS — Z98.1 S/P CERVICAL SPINAL FUSION: Primary | ICD-10-CM

## 2022-06-14 PROBLEM — M48.02 CERVICAL STENOSIS OF SPINE: Status: ACTIVE | Noted: 2022-06-14

## 2022-06-14 PROCEDURE — 74011250636 HC RX REV CODE- 250/636: Performed by: ORTHOPAEDIC SURGERY

## 2022-06-14 PROCEDURE — 77030038692 HC WND DEB SYS IRMX -B: Performed by: ORTHOPAEDIC SURGERY

## 2022-06-14 PROCEDURE — 74011250637 HC RX REV CODE- 250/637: Performed by: ORTHOPAEDIC SURGERY

## 2022-06-14 PROCEDURE — 97161 PT EVAL LOW COMPLEX 20 MIN: CPT

## 2022-06-14 PROCEDURE — 74011000250 HC RX REV CODE- 250: Performed by: ORTHOPAEDIC SURGERY

## 2022-06-14 PROCEDURE — 77030003028 HC SUT VCRL J&J -A: Performed by: ORTHOPAEDIC SURGERY

## 2022-06-14 PROCEDURE — 0RB30ZZ EXCISION OF CERVICAL VERTEBRAL DISC, OPEN APPROACH: ICD-10-PCS | Performed by: ORTHOPAEDIC SURGERY

## 2022-06-14 PROCEDURE — 77030034475 HC MISC IMPL SPN: Performed by: ORTHOPAEDIC SURGERY

## 2022-06-14 PROCEDURE — 77030013567 HC DRN WND RESERV BARD -A: Performed by: ORTHOPAEDIC SURGERY

## 2022-06-14 PROCEDURE — 77030019908 HC STETH ESOPH SIMS -A: Performed by: STUDENT IN AN ORGANIZED HEALTH CARE EDUCATION/TRAINING PROGRAM

## 2022-06-14 PROCEDURE — 74011000250 HC RX REV CODE- 250: Performed by: NURSE ANESTHETIST, CERTIFIED REGISTERED

## 2022-06-14 PROCEDURE — 77030004402 HC BUR NEUR STRY -C: Performed by: ORTHOPAEDIC SURGERY

## 2022-06-14 PROCEDURE — 97116 GAIT TRAINING THERAPY: CPT

## 2022-06-14 PROCEDURE — C1713 ANCHOR/SCREW BN/BN,TIS/BN: HCPCS | Performed by: ORTHOPAEDIC SURGERY

## 2022-06-14 PROCEDURE — 77030020275 HC MISC ORTHOPEDIC: Performed by: ORTHOPAEDIC SURGERY

## 2022-06-14 PROCEDURE — 01N10ZZ RELEASE CERVICAL NERVE, OPEN APPROACH: ICD-10-PCS | Performed by: ORTHOPAEDIC SURGERY

## 2022-06-14 PROCEDURE — 76210000001 HC OR PH I REC 2.5 TO 3 HR: Performed by: ORTHOPAEDIC SURGERY

## 2022-06-14 PROCEDURE — 72020 X-RAY EXAM OF SPINE 1 VIEW: CPT

## 2022-06-14 PROCEDURE — 65270000029 HC RM PRIVATE

## 2022-06-14 PROCEDURE — 77010033678 HC OXYGEN DAILY

## 2022-06-14 PROCEDURE — 2709999900 HC NON-CHARGEABLE SUPPLY: Performed by: ORTHOPAEDIC SURGERY

## 2022-06-14 PROCEDURE — 77030021678 HC GLIDESCP STAT DISP VERT -B: Performed by: STUDENT IN AN ORGANIZED HEALTH CARE EDUCATION/TRAINING PROGRAM

## 2022-06-14 PROCEDURE — 77030026438 HC STYL ET INTUB CARD -A: Performed by: NURSE ANESTHETIST, CERTIFIED REGISTERED

## 2022-06-14 PROCEDURE — 00NW0ZZ RELEASE CERVICAL SPINAL CORD, OPEN APPROACH: ICD-10-PCS | Performed by: ORTHOPAEDIC SURGERY

## 2022-06-14 PROCEDURE — 77030014650 HC SEAL MTRX FLOSEL BAXT -C: Performed by: ORTHOPAEDIC SURGERY

## 2022-06-14 PROCEDURE — 77030002996 HC SUT SLK J&J -A: Performed by: ORTHOPAEDIC SURGERY

## 2022-06-14 PROCEDURE — 94760 N-INVAS EAR/PLS OXIMETRY 1: CPT

## 2022-06-14 PROCEDURE — 77030034479 HC ADH SKN CLSR PRINEO J&J -B: Performed by: ORTHOPAEDIC SURGERY

## 2022-06-14 PROCEDURE — C1889 IMPLANT/INSERT DEVICE, NOC: HCPCS | Performed by: ORTHOPAEDIC SURGERY

## 2022-06-14 PROCEDURE — 0RG20A0 FUSION OF 2 OR MORE CERVICAL VERTEBRAL JOINTS WITH INTERBODY FUSION DEVICE, ANTERIOR APPROACH, ANTERIOR COLUMN, OPEN APPROACH: ICD-10-PCS | Performed by: ORTHOPAEDIC SURGERY

## 2022-06-14 PROCEDURE — 77030011266 HC ELECTRD BLD INSL COVD -A: Performed by: ORTHOPAEDIC SURGERY

## 2022-06-14 PROCEDURE — 77030012407 HC DRN WND BARD -B: Performed by: ORTHOPAEDIC SURGERY

## 2022-06-14 PROCEDURE — 74011250636 HC RX REV CODE- 250/636: Performed by: ANESTHESIOLOGY

## 2022-06-14 PROCEDURE — 77030003029 HC SUT VCRL J&J -B: Performed by: ORTHOPAEDIC SURGERY

## 2022-06-14 PROCEDURE — 77030008462 HC STPLR SKN PROX J&J -A: Performed by: ORTHOPAEDIC SURGERY

## 2022-06-14 PROCEDURE — 76000 FLUOROSCOPY <1 HR PHYS/QHP: CPT

## 2022-06-14 PROCEDURE — 76060000035 HC ANESTHESIA 2 TO 2.5 HR: Performed by: ORTHOPAEDIC SURGERY

## 2022-06-14 PROCEDURE — 76010000171 HC OR TIME 2 TO 2.5 HR INTENSV-TIER 1: Performed by: ORTHOPAEDIC SURGERY

## 2022-06-14 PROCEDURE — 74011250636 HC RX REV CODE- 250/636: Performed by: NURSE ANESTHETIST, CERTIFIED REGISTERED

## 2022-06-14 PROCEDURE — 97530 THERAPEUTIC ACTIVITIES: CPT

## 2022-06-14 PROCEDURE — 77030008684 HC TU ET CUF COVD -B: Performed by: NURSE ANESTHETIST, CERTIFIED REGISTERED

## 2022-06-14 DEVICE — FORTIBRIDGE VARIABLE SCREW 4.5MM X 14MM
Type: IMPLANTABLE DEVICE | Site: SPINE CERVICAL | Status: FUNCTIONAL
Brand: FORTIBRIDGETM

## 2022-06-14 DEVICE — GRAFT BONE 2.5 CC OSTEOAMP: Type: IMPLANTABLE DEVICE | Site: SPINE CERVICAL | Status: FUNCTIONAL

## 2022-06-14 DEVICE — FORTICORE® FLAT CERVICAL SPACER 6X16X14, (6°)
Type: IMPLANTABLE DEVICE | Site: SPINE CERVICAL | Status: FUNCTIONAL
Brand: FORTICORE®

## 2022-06-14 DEVICE — FORTIBRIDGE SELF-TAP VARIABLE SCREW 4MM X 14MM
Type: IMPLANTABLE DEVICE | Site: SPINE CERVICAL | Status: FUNCTIONAL
Brand: FORTIBRIDGETM

## 2022-06-14 RX ORDER — SODIUM CHLORIDE, SODIUM LACTATE, POTASSIUM CHLORIDE, CALCIUM CHLORIDE 600; 310; 30; 20 MG/100ML; MG/100ML; MG/100ML; MG/100ML
25 INJECTION, SOLUTION INTRAVENOUS CONTINUOUS
Status: DISCONTINUED | OUTPATIENT
Start: 2022-06-14 | End: 2022-06-14 | Stop reason: HOSPADM

## 2022-06-14 RX ORDER — MUPIROCIN 20 MG/G
OINTMENT TOPICAL 2 TIMES DAILY
Status: DISCONTINUED | OUTPATIENT
Start: 2022-06-14 | End: 2022-06-15 | Stop reason: HOSPADM

## 2022-06-14 RX ORDER — AMOXICILLIN 250 MG
1 CAPSULE ORAL 2 TIMES DAILY
Status: DISCONTINUED | OUTPATIENT
Start: 2022-06-14 | End: 2022-06-15 | Stop reason: HOSPADM

## 2022-06-14 RX ORDER — DIAZEPAM 5 MG/1
5 TABLET ORAL
Status: DISCONTINUED | OUTPATIENT
Start: 2022-06-14 | End: 2022-06-15 | Stop reason: HOSPADM

## 2022-06-14 RX ORDER — MIDAZOLAM HYDROCHLORIDE 1 MG/ML
INJECTION, SOLUTION INTRAMUSCULAR; INTRAVENOUS AS NEEDED
Status: DISCONTINUED | OUTPATIENT
Start: 2022-06-14 | End: 2022-06-14 | Stop reason: HOSPADM

## 2022-06-14 RX ORDER — CYCLOBENZAPRINE HCL 10 MG
10 TABLET ORAL
Status: DISCONTINUED | OUTPATIENT
Start: 2022-06-14 | End: 2022-06-15 | Stop reason: HOSPADM

## 2022-06-14 RX ORDER — ERGOCALCIFEROL 1.25 MG/1
50000 CAPSULE ORAL
Status: DISCONTINUED | OUTPATIENT
Start: 2022-06-15 | End: 2022-06-15 | Stop reason: HOSPADM

## 2022-06-14 RX ORDER — MENTHOL
1000 GEL (GRAM) TOPICAL DAILY
Status: DISCONTINUED | OUTPATIENT
Start: 2022-06-15 | End: 2022-06-14 | Stop reason: SDUPTHER

## 2022-06-14 RX ORDER — ROCURONIUM BROMIDE 10 MG/ML
INJECTION, SOLUTION INTRAVENOUS AS NEEDED
Status: DISCONTINUED | OUTPATIENT
Start: 2022-06-14 | End: 2022-06-14 | Stop reason: HOSPADM

## 2022-06-14 RX ORDER — SODIUM CHLORIDE 0.9 % (FLUSH) 0.9 %
5-40 SYRINGE (ML) INJECTION AS NEEDED
Status: DISCONTINUED | OUTPATIENT
Start: 2022-06-14 | End: 2022-06-14 | Stop reason: HOSPADM

## 2022-06-14 RX ORDER — SODIUM CHLORIDE 0.9 % (FLUSH) 0.9 %
5-40 SYRINGE (ML) INJECTION AS NEEDED
Status: DISCONTINUED | OUTPATIENT
Start: 2022-06-14 | End: 2022-06-15 | Stop reason: HOSPADM

## 2022-06-14 RX ORDER — HYDROMORPHONE HYDROCHLORIDE 1 MG/ML
1 INJECTION, SOLUTION INTRAMUSCULAR; INTRAVENOUS; SUBCUTANEOUS
Status: ACTIVE | OUTPATIENT
Start: 2022-06-14 | End: 2022-06-15

## 2022-06-14 RX ORDER — LIDOCAINE HYDROCHLORIDE 20 MG/ML
INJECTION, SOLUTION EPIDURAL; INFILTRATION; INTRACAUDAL; PERINEURAL AS NEEDED
Status: DISCONTINUED | OUTPATIENT
Start: 2022-06-14 | End: 2022-06-14 | Stop reason: HOSPADM

## 2022-06-14 RX ORDER — ACETAMINOPHEN 325 MG/1
650 TABLET ORAL ONCE
Status: DISCONTINUED | OUTPATIENT
Start: 2022-06-14 | End: 2022-06-14 | Stop reason: HOSPADM

## 2022-06-14 RX ORDER — SODIUM CHLORIDE 9 MG/ML
125 INJECTION, SOLUTION INTRAVENOUS CONTINUOUS
Status: DISPENSED | OUTPATIENT
Start: 2022-06-14 | End: 2022-06-15

## 2022-06-14 RX ORDER — PHENYLEPHRINE HCL IN 0.9% NACL 0.4MG/10ML
SYRINGE (ML) INTRAVENOUS AS NEEDED
Status: DISCONTINUED | OUTPATIENT
Start: 2022-06-14 | End: 2022-06-14 | Stop reason: HOSPADM

## 2022-06-14 RX ORDER — PANTOPRAZOLE SODIUM 40 MG/1
40 TABLET, DELAYED RELEASE ORAL
Status: DISCONTINUED | OUTPATIENT
Start: 2022-06-15 | End: 2022-06-15 | Stop reason: HOSPADM

## 2022-06-14 RX ORDER — SODIUM CHLORIDE, SODIUM LACTATE, POTASSIUM CHLORIDE, CALCIUM CHLORIDE 600; 310; 30; 20 MG/100ML; MG/100ML; MG/100ML; MG/100ML
50 INJECTION, SOLUTION INTRAVENOUS CONTINUOUS
Status: DISCONTINUED | OUTPATIENT
Start: 2022-06-14 | End: 2022-06-14 | Stop reason: HOSPADM

## 2022-06-14 RX ORDER — ACETAMINOPHEN 500 MG
1000 TABLET ORAL EVERY 6 HOURS
Status: DISCONTINUED | OUTPATIENT
Start: 2022-06-14 | End: 2022-06-15 | Stop reason: HOSPADM

## 2022-06-14 RX ORDER — ZINC SULFATE 50(220)MG
1 CAPSULE ORAL DAILY
Status: DISCONTINUED | OUTPATIENT
Start: 2022-06-15 | End: 2022-06-15 | Stop reason: HOSPADM

## 2022-06-14 RX ORDER — OXYCODONE HYDROCHLORIDE 5 MG/1
5 TABLET ORAL
Status: DISCONTINUED | OUTPATIENT
Start: 2022-06-14 | End: 2022-06-15 | Stop reason: HOSPADM

## 2022-06-14 RX ORDER — PREGABALIN 75 MG/1
150 CAPSULE ORAL ONCE
Status: COMPLETED | OUTPATIENT
Start: 2022-06-14 | End: 2022-06-14

## 2022-06-14 RX ORDER — ONDANSETRON 2 MG/ML
INJECTION INTRAMUSCULAR; INTRAVENOUS AS NEEDED
Status: DISCONTINUED | OUTPATIENT
Start: 2022-06-14 | End: 2022-06-14 | Stop reason: HOSPADM

## 2022-06-14 RX ORDER — LANOLIN ALCOHOL/MO/W.PET/CERES
400 CREAM (GRAM) TOPICAL DAILY
Status: DISCONTINUED | OUTPATIENT
Start: 2022-06-15 | End: 2022-06-15 | Stop reason: HOSPADM

## 2022-06-14 RX ORDER — DEXAMETHASONE SODIUM PHOSPHATE 4 MG/ML
INJECTION, SOLUTION INTRA-ARTICULAR; INTRALESIONAL; INTRAMUSCULAR; INTRAVENOUS; SOFT TISSUE AS NEEDED
Status: DISCONTINUED | OUTPATIENT
Start: 2022-06-14 | End: 2022-06-14 | Stop reason: HOSPADM

## 2022-06-14 RX ORDER — VITAMIN E MIXED 400 UNIT
1000 CAPSULE ORAL DAILY
Status: DISCONTINUED | OUTPATIENT
Start: 2022-06-15 | End: 2022-06-15 | Stop reason: HOSPADM

## 2022-06-14 RX ORDER — PROCHLORPERAZINE EDISYLATE 5 MG/ML
5 INJECTION INTRAMUSCULAR; INTRAVENOUS
Status: ACTIVE | OUTPATIENT
Start: 2022-06-14 | End: 2022-06-15

## 2022-06-14 RX ORDER — FAMOTIDINE 20 MG/1
20 TABLET, FILM COATED ORAL 2 TIMES DAILY
Status: DISCONTINUED | OUTPATIENT
Start: 2022-06-14 | End: 2022-06-15 | Stop reason: HOSPADM

## 2022-06-14 RX ORDER — PROPOFOL 10 MG/ML
INJECTION, EMULSION INTRAVENOUS AS NEEDED
Status: DISCONTINUED | OUTPATIENT
Start: 2022-06-14 | End: 2022-06-14 | Stop reason: HOSPADM

## 2022-06-14 RX ORDER — FENTANYL CITRATE 50 UG/ML
INJECTION, SOLUTION INTRAMUSCULAR; INTRAVENOUS AS NEEDED
Status: DISCONTINUED | OUTPATIENT
Start: 2022-06-14 | End: 2022-06-14 | Stop reason: HOSPADM

## 2022-06-14 RX ORDER — NALOXONE HYDROCHLORIDE 0.4 MG/ML
0.4 INJECTION, SOLUTION INTRAMUSCULAR; INTRAVENOUS; SUBCUTANEOUS AS NEEDED
Status: DISCONTINUED | OUTPATIENT
Start: 2022-06-14 | End: 2022-06-15 | Stop reason: HOSPADM

## 2022-06-14 RX ORDER — CETIRIZINE HCL 10 MG
10 TABLET ORAL DAILY
Status: DISCONTINUED | OUTPATIENT
Start: 2022-06-15 | End: 2022-06-15 | Stop reason: HOSPADM

## 2022-06-14 RX ORDER — OMEGA-3-ACID ETHYL ESTERS 1 G/1
1 CAPSULE, LIQUID FILLED ORAL DAILY
Status: DISCONTINUED | OUTPATIENT
Start: 2022-06-15 | End: 2022-06-15 | Stop reason: HOSPADM

## 2022-06-14 RX ORDER — ACETAMINOPHEN 500 MG
1000 TABLET ORAL ONCE
Status: COMPLETED | OUTPATIENT
Start: 2022-06-14 | End: 2022-06-14

## 2022-06-14 RX ORDER — LIDOCAINE HYDROCHLORIDE 10 MG/ML
0.1 INJECTION, SOLUTION EPIDURAL; INFILTRATION; INTRACAUDAL; PERINEURAL AS NEEDED
Status: DISCONTINUED | OUTPATIENT
Start: 2022-06-14 | End: 2022-06-14 | Stop reason: HOSPADM

## 2022-06-14 RX ORDER — POLYETHYLENE GLYCOL 3350 17 G/17G
17 POWDER, FOR SOLUTION ORAL DAILY
Status: DISCONTINUED | OUTPATIENT
Start: 2022-06-15 | End: 2022-06-15 | Stop reason: HOSPADM

## 2022-06-14 RX ORDER — NEOSTIGMINE METHYLSULFATE 1 MG/ML
INJECTION, SOLUTION INTRAVENOUS AS NEEDED
Status: DISCONTINUED | OUTPATIENT
Start: 2022-06-14 | End: 2022-06-14 | Stop reason: HOSPADM

## 2022-06-14 RX ORDER — OXYCODONE HYDROCHLORIDE 5 MG/1
10 TABLET ORAL
Status: DISCONTINUED | OUTPATIENT
Start: 2022-06-14 | End: 2022-06-15 | Stop reason: HOSPADM

## 2022-06-14 RX ORDER — GLYCOPYRROLATE 0.2 MG/ML
INJECTION INTRAMUSCULAR; INTRAVENOUS AS NEEDED
Status: DISCONTINUED | OUTPATIENT
Start: 2022-06-14 | End: 2022-06-14 | Stop reason: HOSPADM

## 2022-06-14 RX ORDER — FACIAL-BODY WIPES
10 EACH TOPICAL DAILY PRN
Status: DISCONTINUED | OUTPATIENT
Start: 2022-06-16 | End: 2022-06-15 | Stop reason: HOSPADM

## 2022-06-14 RX ORDER — SODIUM CHLORIDE 0.9 % (FLUSH) 0.9 %
5-40 SYRINGE (ML) INJECTION EVERY 8 HOURS
Status: DISCONTINUED | OUTPATIENT
Start: 2022-06-14 | End: 2022-06-15 | Stop reason: HOSPADM

## 2022-06-14 RX ORDER — ONDANSETRON 2 MG/ML
4 INJECTION INTRAMUSCULAR; INTRAVENOUS
Status: ACTIVE | OUTPATIENT
Start: 2022-06-14 | End: 2022-06-15

## 2022-06-14 RX ORDER — SODIUM CHLORIDE 0.9 % (FLUSH) 0.9 %
5-40 SYRINGE (ML) INJECTION EVERY 8 HOURS
Status: DISCONTINUED | OUTPATIENT
Start: 2022-06-14 | End: 2022-06-14 | Stop reason: HOSPADM

## 2022-06-14 RX ORDER — GABAPENTIN 100 MG/1
100 CAPSULE ORAL 3 TIMES DAILY
Status: DISCONTINUED | OUTPATIENT
Start: 2022-06-14 | End: 2022-06-15 | Stop reason: HOSPADM

## 2022-06-14 RX ORDER — HYDROXYZINE HYDROCHLORIDE 10 MG/1
10 TABLET, FILM COATED ORAL
Status: DISCONTINUED | OUTPATIENT
Start: 2022-06-14 | End: 2022-06-15 | Stop reason: HOSPADM

## 2022-06-14 RX ORDER — SUCCINYLCHOLINE CHLORIDE 20 MG/ML
INJECTION INTRAMUSCULAR; INTRAVENOUS AS NEEDED
Status: DISCONTINUED | OUTPATIENT
Start: 2022-06-14 | End: 2022-06-14 | Stop reason: HOSPADM

## 2022-06-14 RX ORDER — MONTELUKAST SODIUM 10 MG/1
10 TABLET ORAL DAILY
Status: DISCONTINUED | OUTPATIENT
Start: 2022-06-15 | End: 2022-06-15 | Stop reason: HOSPADM

## 2022-06-14 RX ADMIN — ACETAMINOPHEN 1000 MG: 500 TABLET ORAL at 17:44

## 2022-06-14 RX ADMIN — PREGABALIN 150 MG: 75 CAPSULE ORAL at 09:00

## 2022-06-14 RX ADMIN — ACETAMINOPHEN 1000 MG: 500 TABLET ORAL at 23:46

## 2022-06-14 RX ADMIN — ROCURONIUM BROMIDE 10 MG: 10 INJECTION INTRAVENOUS at 11:47

## 2022-06-14 RX ADMIN — Medication 3 AMPULE: at 08:56

## 2022-06-14 RX ADMIN — GABAPENTIN 100 MG: 100 CAPSULE ORAL at 21:43

## 2022-06-14 RX ADMIN — GABAPENTIN 100 MG: 100 CAPSULE ORAL at 17:44

## 2022-06-14 RX ADMIN — Medication 5 MG: at 12:37

## 2022-06-14 RX ADMIN — SODIUM CHLORIDE 25 MCG/MIN: 900 INJECTION, SOLUTION INTRAVENOUS at 10:46

## 2022-06-14 RX ADMIN — SENNOSIDES AND DOCUSATE SODIUM 1 TABLET: 50; 8.6 TABLET ORAL at 17:45

## 2022-06-14 RX ADMIN — WATER 2 G: 1 INJECTION INTRAMUSCULAR; INTRAVENOUS; SUBCUTANEOUS at 10:48

## 2022-06-14 RX ADMIN — SODIUM CHLORIDE, PRESERVATIVE FREE 10 ML: 5 INJECTION INTRAVENOUS at 21:43

## 2022-06-14 RX ADMIN — Medication 1000 MG: at 09:00

## 2022-06-14 RX ADMIN — SODIUM CHLORIDE, POTASSIUM CHLORIDE, SODIUM LACTATE AND CALCIUM CHLORIDE 25 ML/HR: 600; 310; 30; 20 INJECTION, SOLUTION INTRAVENOUS at 09:08

## 2022-06-14 RX ADMIN — PROPOFOL 150 MG: 10 INJECTION, EMULSION INTRAVENOUS at 10:43

## 2022-06-14 RX ADMIN — OXYCODONE 5 MG: 5 TABLET ORAL at 17:43

## 2022-06-14 RX ADMIN — LIDOCAINE HYDROCHLORIDE 80 MG: 20 INJECTION, SOLUTION EPIDURAL; INFILTRATION; INTRACAUDAL; PERINEURAL at 10:43

## 2022-06-14 RX ADMIN — FAMOTIDINE 20 MG: 20 TABLET ORAL at 17:46

## 2022-06-14 RX ADMIN — OXYCODONE 5 MG: 5 TABLET ORAL at 21:43

## 2022-06-14 RX ADMIN — MIDAZOLAM HYDROCHLORIDE 1 MG: 1 INJECTION, SOLUTION INTRAMUSCULAR; INTRAVENOUS at 11:06

## 2022-06-14 RX ADMIN — ROCURONIUM BROMIDE 5 MG: 10 INJECTION INTRAVENOUS at 10:43

## 2022-06-14 RX ADMIN — ROCURONIUM BROMIDE 25 MG: 10 INJECTION INTRAVENOUS at 10:45

## 2022-06-14 RX ADMIN — WATER 2 G: 1 INJECTION INTRAMUSCULAR; INTRAVENOUS; SUBCUTANEOUS at 17:48

## 2022-06-14 RX ADMIN — SODIUM CHLORIDE 125 ML/HR: 9 INJECTION, SOLUTION INTRAVENOUS at 13:36

## 2022-06-14 RX ADMIN — ONDANSETRON HYDROCHLORIDE 4 MG: 2 INJECTION, SOLUTION INTRAMUSCULAR; INTRAVENOUS at 12:22

## 2022-06-14 RX ADMIN — FENTANYL CITRATE 50 MCG: 50 INJECTION, SOLUTION INTRAMUSCULAR; INTRAVENOUS at 10:40

## 2022-06-14 RX ADMIN — SUCCINYLCHOLINE CHLORIDE 160 MG: 20 INJECTION, SOLUTION INTRAMUSCULAR; INTRAVENOUS at 10:43

## 2022-06-14 RX ADMIN — Medication 200 MCG: at 10:45

## 2022-06-14 RX ADMIN — GLYCOPYRROLATE 0.6 MG: 0.2 INJECTION, SOLUTION INTRAMUSCULAR; INTRAVENOUS at 12:37

## 2022-06-14 RX ADMIN — DEXAMETHASONE SODIUM PHOSPHATE 8 MG: 4 INJECTION, SOLUTION INTRAMUSCULAR; INTRAVENOUS at 10:48

## 2022-06-14 RX ADMIN — FENTANYL CITRATE 50 MCG: 50 INJECTION, SOLUTION INTRAMUSCULAR; INTRAVENOUS at 11:20

## 2022-06-14 NOTE — PROGRESS NOTES
Problem: Mobility Impaired (Adult and Pediatric)  Goal: *Acute Goals and Plan of Care (Insert Text)  Description: FUNCTIONAL STATUS PRIOR TO ADMISSION: Pt independent household and short community distances without device PTA. Pt reports adjusting tasks at home due to poor  strength. HOME SUPPORT PRIOR TO ADMISSION: Pt live with family in 1 story home with steps to enter. Will need assist upon DC due to poor . Physical Therapy Goals  Initiated 6/14/2022  1. Patient will move from supine to sit and sit to supine , scoot up and down, and roll side to side in bed with supervision/set-up within 7 day(s). 2.  Patient will transfer from bed to chair and chair to bed with supervision/set-up using the least restrictive device within 7 day(s). 3.  Patient will perform sit to stand with supervision/set-up within 7 day(s). 4.  Patient will ambulate with minimal assistance/contact guard assist for 100 feet with the least restrictive device within 7 day(s). 5.  Patient will ascend/descend 4 stairs with B handrail(s) with minimal assistance/contact guard assist within 7 day(s). Outcome: Progressing Towards Goal  PHYSICAL THERAPY EVALUATION  Patient: Dannielle Archer (22 y.o. female)  Date: 6/14/2022  Primary Diagnosis: Cervicalgia [M54.2]  Radiculopathy, cervical [M54.12]  DDD (degenerative disc disease), cervical [M50.30]  Spinal stenosis in cervical region [M48.02]  Foraminal stenosis of cervical region [M48.02]  Cervical spondylosis with myelopathy [M47.12]  Numbness of hand [R20.0]  Cervical stenosis of spine [M48.02]  Procedure(s) (LRB):  ANTERIOR CERVICAL DISCECTOMY AND FUSION CERVICAL three TO CERVICAL six (N/A) Day of Surgery   Precautions: Fall, neck, C-Collar at all times       ASSESSMENT  Based on the objective data described below, the patient presents POD 0 s/p C3-6 ACDF. Pt received supine in bed, agreeable to session. Vitals stable on room air and with activity.  Pt required Min A for rolling and supine to sit, limited in assistance provided due to B hand weakness and poor  to push self up/scoot forward. Pt unable to  RW in standing, required HHA for all gait and standing balance. Sit <> stands from edge of bed with Min A. Pt walked 20ft x2 bed <> commode for voiding trial, HHA and Min A overall. Inconsistent steps noted with path deviations but no buckling or loss of balance. Pt returned to bed at end of session with all needs met, call bell in reach. Pt will require 1-2 more PT sessions to address distance gait, stairs, and car transfer prior to DC. Pt likely will require HH PT with family assist and follow up OP PT services to address mobility deficits upon DC. Will continue to follow. Current Level of Function Impacting Discharge (mobility/balance): min A with hand held assist    Functional Outcome Measure: The patient scored 50/100 on the Barthel Index outcome measure which is indicative of 50% impairment in mobility and ADLs. Other factors to consider for discharge: previously independent without device, progressive neck pain. Pt is retired RN. Patient will benefit from skilled therapy intervention to address the above noted impairments. PLAN :  Recommendations and Planned Interventions: bed mobility training, transfer training, gait training, therapeutic exercises, neuromuscular re-education, patient and family training/education, and therapeutic activities      Frequency/Duration: Patient will be followed by physical therapy:  twice daily to address goals. Recommendation for discharge: (in order for the patient to meet his/her long term goals)  HH PT with family assist followed by OP PT as indicated.     This discharge recommendation:  Has been made in collaboration with the attending provider and/or case management    IF patient discharges home will need the following DME: to be determined (TBD)         SUBJECTIVE:   Patient stated these hands, I just don't trust them.    OBJECTIVE DATA SUMMARY:   HISTORY:    Past Medical History:   Diagnosis Date    GERD (gastroesophageal reflux disease)     Hypertension     Kidney stones     Menopause     Osteopenia     Osteoporosis     Spinal stenosis     Vitamin D deficiency      Past Surgical History:   Procedure Laterality Date    HX HYSTERECTOMY      HX LITHOTRIPSY      x 2    HX OOPHORECTOMY         Personal factors and/or comorbidities impacting plan of care: pt is highly motivated, good family support, 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 EXAMINATION/PRESENTATION/DECISION MAKING:   Critical Behavior:  Neurologic State: Alert,Appropriate for age  Orientation Level: Oriented X4  Cognition: Follows commands     Hearing: Auditory  Auditory Impairment: None  Hearing Aids/Status: Does not own  Skin:  appears intact. Dressing anterior neck intact and dry  Edema: none noted  Range Of Motion:  AROM: Generally decreased, functional           PROM: Generally decreased, functional           Strength:    Strength: Generally decreased, functional                    Tone & Sensation:   Tone: Normal (decreased  B hands)              Sensation: Impaired (B UE sensory changes)               Coordination:  Coordination: Generally decreased, functional  Vision:      Functional Mobility:  Bed Mobility:  Rolling: Minimum assistance  Supine to Sit: Minimum assistance  Sit to Supine: Minimum assistance  Scooting: Minimum assistance  Transfers:  Sit to Stand: Minimum assistance  Stand to Sit: Minimum assistance                       Balance:   Sitting: Impaired; Without support  Sitting - Static: Good (unsupported); Supported sitting  Sitting - Dynamic: Fair (occasional); Supported sitting  Standing: Impaired; With support  Standing - Static: Constant support;Good  Standing - Dynamic : Constant support; Fair  Ambulation/Gait Training:  Distance (ft): 20 Feet (ft) (20ft x2)  Assistive Device: Gait belt (B hand held assist. Unable to hold RW with BUEs 2/2  poo)  Ambulation - Level of Assistance: Minimal assistance        Gait Abnormalities: Decreased step clearance; Path deviations        Base of Support: Widened     Speed/Neida: Shuffled; Slow  Step Length: Left shortened;Right shortened               Functional Measure:  Barthel Index:    Bathin  Bladder: 10  Bowels: 10  Groomin  Dressin  Feedin  Mobility: 0  Stairs: 0  Toilet Use: 5  Transfer (Bed to Chair and Back): 10  Total: 50/100       The Barthel ADL Index: Guidelines  1. The index should be used as a record of what a patient does, not as a record of what a patient could do. 2. The main aim is to establish degree of independence from any help, physical or verbal, however minor and for whatever reason. 3. The need for supervision renders the patient not independent. 4. A patient's performance should be established using the best available evidence. Asking the patient, friends/relatives and nurses are the usual sources, but direct observation and common sense are also important. However direct testing is not needed. 5. Usually the patient's performance over the preceding 24-48 hours is important, but occasionally longer periods will be relevant. 6. Middle categories imply that the patient supplies over 50 per cent of the effort. 7. Use of aids to be independent is allowed. Score Interpretation (from [facility] 83)    Independent   60-79 Minimally independent   40-59 Partially dependent   20-39 Very dependent   <20 Totally dependent     -Nicole Shin., Barthel, D.W. (1965). Functional evaluation: the Barthel Index. [address] [facility] ([address] (). The Barthel activities of daily living index: self-reporting versus actual performance in the old (> or = 75 years). Journal of [address] [place] [address] 45(7), [facility], .LAURAF, Ingrid Jaquez., Jonathan Matson (1999).  Measuring the change in disability after inpatient rehabilitation; comparison of the responsiveness of the Barthel Index and Functional [place] Measure. Journal of Neurology, Neurosurgery, and Psychiatry, 66(4), 189-943. GRAEME Abreu.MELLISSA, EMERY Grace, & Екатерина Henderson M.A. (2004) Assessment of post-stroke quality of life in cost-effectiveness studies: The usefulness of the Barthel Index and the EuroQoL-5D. Quality of Life Research, 15, 476-94           Physical Therapy Evaluation Charge Determination   History Examination Presentation Decision-Making   MEDIUM  Complexity : 1-2 comorbidities / personal factors will impact the outcome/ POC  LOW Complexity : 1-2 Standardized tests and measures addressing body structure, function, activity limitation and / or participation in recreation  LOW Complexity : Stable, uncomplicated  LOW Complexity : FOTO score of       Based on the above components, the patient evaluation is determined to be of the following complexity level: LOW     Pain Ratin/10 at start of session, 5/10 after gait. Improved with B UEs on pillows in supine    Activity Tolerance:   Fair and requires rest breaks    After treatment patient left in no apparent distress:   Supine in bed, Heels elevated for pressure relief, Call bell within reach, and Side rails x 3    COMMUNICATION/EDUCATION:   The patients plan of care was discussed with: Registered nurse. Fall prevention education was provided and the patient/caregiver indicated understanding.     Thank you for this referral.  April De La Rosa, PT, DPT, NCS   Time Calculation: 29 mins

## 2022-06-14 NOTE — BRIEF OP NOTE
Brief Postoperative Note    Patient: Gary Doshi  YOB: 1950  MRN: [id number]    Date of Procedure: 6/14/2022     Pre-Op Diagnosis: Cervicalgia [M54.2]  Radiculopathy, cervical [M54.12]  DDD (degenerative disc disease), cervical [M50.30]  Spinal stenosis in cervical region [M48.02]  Foraminal stenosis of cervical region [M48.02]  Cervical spondylosis with myelopathy [M47.12]  Numbness of hand [R20.0]    Post-Op Diagnosis: Same as preoperative diagnosis. Procedure(s):  ANTERIOR CERVICAL DISCECTOMY AND FUSION CERVICAL three TO CERVICAL six    Surgeon(s):  Freddy Hendricks MD    Surgical Assistant: Physician Assistant: ABBY Parnell    Anesthesia: Other     Estimated Blood Loss (mL): Minimal    Complications: None    Specimens: * No specimens in log *     Implants:   Implant Name Type Inv.  Item Serial No.  Lot No. LRB No. Used Action   GRAFT BONE 2.5 CC OSTEOAMP - [id number]  GRAFT BONE 2.5 CC OSTEOAMP [id number] [employer] NA N/A 1 Implanted   CAGE SPNL [id number] 6DEG PEEK OPTMA CERV INTBDY FUS FLAT - SNA  CAGE SPNL [id number] 6DEG PEEK OPTMA CERV INTBDY FUS FLAT NA [employer] [id number] N/A 3 Implanted   SELF TAP VARIABLE SCREW 4.0 X 14MM   NA [employer] [facility] [id number] N/A 6 Implanted   SCREW SPNL ANGELY 4.5X14 MM ANTR CERV FORTIBRIDGE - SNA  SCREW SPNL ANGELY 4.5X14 MM ANTR CERV FORTIBRIDGE NA [employer] [id number] N/A 2 Implanted   51 MM PLATE   [id number] [employer] NA N/A 1 Implanted       Drains: * No LDAs found *    Findings: Stenosis    Electronically Signed by Deshaun Byrne MD on 6/14/2022 at 12:28 PM

## 2022-06-14 NOTE — H&P
Progress Notes  [facility] Orthopedic Surgery  Cosigned by: Jody Asif MD at 6/3/2022  2:14 PM   Expand All Collapse All  ASSESSMENT:  (M54.50) Lumbar pain  (primary encounter diagnosis)  (M54.2) Cervicalgia  (M54.12) Cervical radiculopathy  (M47.812) Cervical spondylosis without myelopathy  (M47.817) Lumbosacral spondylosis without myelopathy  (M50.30) DDD (degenerative disc disease), cervical  (M51.36) DDD (degenerative disc disease), lumbar  (M48.02) Spinal stenosis in cervical region  (M48.02) Foraminal stenosis of cervical region  (M47.12) Cervical spondylosis with myelopathy  (R20.0) Bilateral hand numbness     There is no problem list on file for this patient.        Impression: severe spinal stenosis C3-C6 with cord compression and myelomalacia; cervicalgia with bilateral hand numbness     PLAN:  The patient verbalized understanding of exam findings and treatment plan. We engaged in the shared decision-making process and treatment options, along with risks and benefits, were discussed at length with the patient.      I discussed treatment options and reviewed the exam findings with Ms. Blake today. She presents with strongly positive whitehead's bilaterally, abnormal reflexes and unstable gait. Her EMG shows chronic mild cervical radiculopathy. We discussed given her cord compression and myelomalacia, it is recommended to proceed with surgery to protect her spinal cord and prevent paralysis if she her to fall and be injured. There is no guarantee that her gait or hand numbness with improve. She is a candidate for C3-C6 ACDF. We discussed the operation in detail and answered her questions.  She is ready to proceed.      I have discussed the procedure in detail with the patient and mentioned complications, including but not limited to: death, permanent disability, heart attack, stroke, lung injury or infection, blindness, ileus, bladder or bowel problems, bleeding, nerve injury (including numbness, pain and weakness), paralysis (which may be permanent), failure to heal, failure to fuse bone together in fusion procedures, failure to relief symptoms, failure to relief pain, increased pain, need for further surgeries, failure or breakage or hardware, malpositioning of hardware, need to fuse or operate on additional levels determined either during or after surgery, destabilization of the spine (which may require fusion or later surgery), infections (which may or may not require additional surgery), dural tears (tears of the sac holding in nerves and spinal fluid), meningitis, voice changes, vocal cord injury, hoarseness, C5 palsy with arm weakness, blood clots, pulmonary embolus, Luis syndrome, recurrent disc herniation, diaphragm paralysis, and anesthetic complications. Comorbidities such as obesity, smoking, rheumatoid arthritis, chronic steroid use and diabetes increase these risks. The patient understands and wants to proceed.     The patient has been prescribed a rigid cervical collar pre-operatively for pain relief. The orthosis is medically necessary to reduce pain by restricting mobility of the trunk and to otherwise support weak spinal muscles and/or deformed spine. The patient will meet with our bracing coordinator to be fit for the brace. Follow up after surgery.            Treatment Plan:       Orders Placed This Encounter    Surgical Posting Sheet    X-ray lumbar spine complete 4+ views    X-ray cervical spine complete 4 or 5 views ([id number])    BP Patient Education    BMI Patient Education            Follow-up: Return for Follow up 2-3 weeks after surgery.         HISTORY OF PRESENT ILLNESS:  Burnell Nissen; [id number]   Age: 67 y.o.  Sex: female   Pain score: Pain rating =   8 out of 10     Chief Complaint: Pain of the Neck        History of present illness:  Burnell Nissen is a 67 y.o. female with complaints of neck pain with numbness in the hands worse in the little, ring and long fingers. She notes her symptoms are worse at night and she wears gloves on her hands to sleep. Her symptoms have been present since February 2022. She denies any radiation down the arms. It is described as achy, tingling and numb and is there daily. Trinh Ewing has tried meloxicam.  The pain is rated 8 out of 10 on the VAS.         There are no problems to display for this patient.              Family History   Problem Relation Age of Onset    No Known Problems Mother      No Known Problems Father      No Known Problems Brother      No Known Problems Sister      No Known Problems Son      No Known Problems Daughter      Diabetes Neg Hx      Coronary artery disease Neg Hx      Clotting disorder Neg Hx      Anesthesia problems Neg Hx           Social History      Tobacco Use    Smoking status: Never    Smokeless tobacco: Never   Substance Use Topics    Alcohol use: Never         Medical History        Past Medical History:   Diagnosis Date    Hypertension              Surgical History         Past Surgical History:   Procedure Laterality Date    NO RELEVANT ORTHOPAEDIC SURGERIES        NO RELEVANT SURGERIES                   Current Outpatient Medications:    alendronate (FOSAMAX) 70 MG tablet, TAKE 1 TAB BY MOUTH WEEKLY ON EMPTY STOMACH WITH 8 OZ OF WATER STAY UPRIGHT FOR 60 MIN, Disp: , Rfl:    lisinopril-hydrochlorothiazide (PRINZIDE,ZESTORETIC) 10-12.5 MG per tablet, Take 1 tablet by mouth once daily, Disp: , Rfl:    meloxicam (MOBIC) 15 MG tablet, Take 15 mg by mouth once daily, Disp: , Rfl:    montelukast (SINGULAIR) 10 MG tablet, Take 10 mg by mouth nightly, Disp: , Rfl:    omeprazole (PriLOSEC) 20 MG capsule, Take by mouth once daily, Disp: , Rfl:    traMADol (ULTRAM) 50 MG tablet, , Disp: , Rfl:    Vitamin D, Ergocalciferol, 1.25 MG (73855 UT) capsule, TAKE 1 CAPSULE ONCE A WEEK (TAKE WITH FOOD FOR BEST ABSORPTION), Disp: , Rfl:            Allergies   Allergen Reactions    Calcium       Other reaction(s): Unknown (comments)    Copper Rash         ROS:  No new bowel or bladder incontinence. No fever. No saddle anesthesia.     OBJECTIVE:      Vitals:     06/03/22 1123   BP: (!) 152/76         Body mass index is 27.62 kg/m². , a BMI over 30 is considered obese and a BMI over 40 has been associated with a higher risk of surgical complications.     Constitutional: No acute distress. Well nourished. Respiratory:  No labored breathing. Cardiovascular:  No marked cyanosis. Skin:  No marked skin ulcers/lesions on bilateral upper or lower extremities. Psychiatric: Alert and oriented x3. Inspection: No gross deformity of bilateral upper or lower extremities. Musculoskeletal/Neurological:   Gait/Balance:  - Unsteady. Instability tandem walking. Neck:  - No tenderness to palpation  - Full range of motion  Right upper extremity:  - No tenderness to palpation  - Full range of motion  - Strength:  - 5 out of 5 to deltoid  - 5 out of 5 to biceps  - 5 out of 5 to wrist extensors  - 5 out of 5 to triceps  - 5 out of 5 to finger flexors  - 5 out of 5 to intrinsics  Left upper extremity:  - No tenderness to palpation  - Full range of motion  - Strength:  - 5 out of 5 to deltoid  - 5 out of 5 to biceps  - 5 out of 5 to wrist extensors  - 5 out of 5 to triceps  - 5 out of 5 to finger flexors  - 5 out of 5 to intrinsics  Sensation:  - Intact to light touch  Reflexes:  - +3 biceps  - +3 brachioradialis  - +3 triceps  Strongly positive Jluis's bilaterally.             RESULTS REVIEWED:   Order: XR SPINE LUMBAR COMPLETE 4+ VW - Indication: Lumbar pain  Order: XR CERVICAL COMPLETE 4 OR 5 VW - Indication: Cervicalgia          X-ray cervical spine complete 4 or 5 views ([id number])     Result Date: 6/3/2022  Standing. AP, Lateral, Flexion, Extension.     Impression: X-rays of the cervical spine show degenerative changes throughout.  No fractures or dislocations.     X-ray lumbar spine complete 4+ views     Result Date: 6/3/2022  Shielding: N/A. Standing. AP, Lat, Flexion, Extension.     Impression: X-rays of the lumbar spine show degenerative changes throughout. No fractures or dislocations.        I personally and independently reviewed the cervical spine MRI done at [facility] in March 2022 that shows severe stenosis C3-C6 with cord compression and myelomalacia.       I personally and independently reviewed the EMG done in March that shows chronic mild cervical radiculopathy with no evidence of active denervation. No evidence of neuropathy.      I have instructed the patient to continue to work on their physician supervised home exercise program.     This note has been transcribed electronically using voice recognition and a trained scribe. It is believed to be accurate, but may contain errors secondary to technological limitations and other factors.     I, Harsh Rodriguez MD, personally, performed the services described in this documentation, as scribed in my presence, and it is both accurate and complete.   Scribed by: Cathy Delaney

## 2022-06-14 NOTE — PERIOP NOTES
0845:  Patient is c/o 10/10 neck pain that radiates into bilateral shoulders. She has extremely limited movement of head & neck. Bilat hands slightly contracted with decreased dexterity. She  Reports numbness in fingers. Once patient was assisted in to bed after cleansing with CHG & changing into surgical gown she stated she was more comfortable than she had been in weeks. She was medicated per [facility] with tylenol & lyrica. Will continue to monitor if there is a need for medications. 0919:  Patient is asleep in bed with warming blanket on. Call bell at her side.

## 2022-06-14 NOTE — PROGRESS NOTES
TRANSFER - IN REPORT:    Verbal report received from Juliet RN(name) on Beti Blake  being received from [employer]) for routine post - op      Report consisted of patients Situation, Background, Assessment and   Recommendations(SBAR). Information from the following report(s) SBAR and Kardex was reviewed with the receiving nurse. Opportunity for questions and clarification was provided. Assessment completed upon patients arrival to unit and care assumed.

## 2022-06-14 NOTE — DISCHARGE INSTRUCTIONS
Kaila Deleon 70    Discharge Instruction Sheet: Anterior Cervical Fusion      Pain control:  Typically, we will prescribe a narcotic usually 1-2 tabs every four hours is sufficient for the pain. Most patients need this only for the first few weeks. You should discontinue this as the pain decreases. You should not drive while taking any narcotic pain medications. Constipation:  Pain medicines and anesthesia can be constipating-this can be prevented by gentle physical activity and drinking plenty of fluid. It should be treated with over-the-counter medications such as Miralax or suppositories, and/or Fleets enema. You should have a bowel movement at least every other day following surgery. Incision care:   Keep this area clean and dry. Do not remove the dressing. DO NOT take a tub bath or go swimming until cleared by your doctor. DO NOT apply lotions, oils, or creams to incision. Cover the wound with an impermiable dressing to shower for then next 5 days, then no cover is needed. Wear cervical collar for comfort. If staples are in place, they should be removed about 14-20 days after surgery. To increase and promote healing:   Stop Smoking (or at least cut back on smoking).  Eat a well-balanced diet (high in protein and vitamin C)   If your appetite is poor, consider nutritional supplements like Ensure, Glucerna, or [place] Instant Breakfast.   If you are diabetic, controlling you blood sugars is very important to prevent infection and promote wound healing. Nutrition:  If you were on a supplement such as Ensure or Glucerna) while in the hospital, please continue using them with each meal for the next 30 days.   Eat a well-balanced diet - High in protein, high in vitamins and minerals, especially vitamin C and zinc.     Restrictions:  Limit bending and twisting  Wear collar as instructed  Lift no more than 10 pounds    Warning signs: Please call your physician IMMEDIATELY at [phone] if you have   If you have throat soreness that worsens   If you have difficulty swallowing.  If you have any difficulty breathing   Bleeding from incision that is constant.  Change in mental status (unusual behavior or confusion)   If your incision develops redness or swelling   Change in wound drainage (increase in amount, color, or foul odor)   [place] over 101.5 degrees Fahrenheit    Headache that is not relieved with pain medication   Tenderness or redness in the calf of your leg    Emergency: CALL [phone] if you have   Any Difficulty Breathing or Shortness of breath   Difficulty Swallowing   Chest pain   Localized chest pain when coughing or taking a deep breath    Blu Blake    Follow-up:  Please call Dr. Rufus Hernandez office for a follow up appointment in 2-3 weeks at [phone]. You can return to work when cleared by a physician. During normal business hours you may reach Dr. Fredis Hanks' team directly at [phone] if you have concerns or questions.

## 2022-06-14 NOTE — PROGRESS NOTES
CM following planned spinal surgery patient to assist with any potential d/c needs.      [facility], [address]

## 2022-06-14 NOTE — ANESTHESIA POSTPROCEDURE EVALUATION
Post-Anesthesia Evaluation and Assessment    Patient: Miguel Angel Miguel MRN: [id number]  SSN: [id number]    YOB: 1950  Age: 67 y.o. Sex: female      I have evaluated the patient and they are stable and ready for discharge from the PACU. Cardiovascular Function/Vital Signs  Visit Vitals  /65   Pulse 65   Temp 36.6 °C (97.9 °F)   Resp 14   Ht 5' 2\" (1.575 m)   Wt 66.8 kg (147 lb 4.3 oz)   SpO2 96%   BMI 26.94 kg/m²       Patient is status post Other anesthesia for Procedure(s):  ANTERIOR CERVICAL DISCECTOMY AND FUSION CERVICAL three TO CERVICAL six. Nausea/Vomiting: None    Postoperative hydration reviewed and adequate. Pain:  Managed    Neurological Status: At baseline    Mental Status, Level of Consciousness: Alert and  oriented to person, place, and time    Pulmonary Status:   Adequate oxygenation and airway patent    Complications related to anesthesia: None    Post-anesthesia assessment completed. No concerns.      Signed By: Deysi Mathis DO     June 14, 2022

## 2022-06-14 NOTE — PROGRESS NOTES
End of Shift Note    Bedside shift change report given to Muna (oncoming nurse) by Mana Almeida RN (offgoing nurse). Report included the following information SBAR and Kardex    Shift worked:  7a-7p     Shift summary and any significant changes:     pod 0, resting in bed. Voided with PT in the bathroom. Pain controlled with oral meds, hands numb and weak. Needs assistance     Concerns for physician to address:  none     Zone phone for oncoming shift:   [phone]       Activity:  Activity Level: Up with Assistance  Number times ambulated in hallways past shift: 1  Number of times OOB to chair past shift: 0    Cardiac:   Cardiac Monitoring: No      Cardiac Rhythm: Sinus Nino    Access:   Current line(s): PIV     Genitourinary:   Urinary status: voiding    Respiratory:   O2 Device: Nasal cannula  Chronic home O2 use?: NO  Incentive spirometer at bedside: NO  Actual Volume (ml): 1250 ml    GI:  Last Bowel Movement Date: 06/13/22  Current diet:  ADULT DIET Regular  Passing flatus: NO  Tolerating current diet: YES       Pain Management:   Patient states pain is manageable on current regimen: YES    Skin:  Tian Score: 19  Interventions: increase time out of bed, PT/OT consult, limit briefs and nutritional support     Patient Safety:  Fall Score:  Total Score: 3  Interventions: bed/chair alarm, assistive device (walker, cane, etc) and gripper socks  High Fall Risk: Yes    Length of Stay:  Expected LOS: - - -  Actual LOS: 0      Mana Almeida RN

## 2022-06-14 NOTE — OP NOTES
Carlos [facility]ayla  OPERATIVE REPORT    Name:  Isael Thao  MR#:  [id number]  :  1950  DATE OF SERVICE:  2022    PREOPERATIVE DIAGNOSES:  1. Cervicalgia    2. Cervical radiculopathy    3. Cervical spondylosis without myelopathy    4. Spinal stenosis in cervical region    5. Foraminal stenosis of cervical region    6. Cervical spondylosis with myelopathy    7. Bilateral hand numbness    8. DDD (degenerative disc disease), cervical        POSTOPERATIVE DIAGNOSES:  1. Cervicalgia    2. Cervical radiculopathy    3. Cervical spondylosis without myelopathy    4. Spinal stenosis in cervical region    5. Foraminal stenosis of cervical region    6. Cervical spondylosis with myelopathy    7. Bilateral hand numbness    8. DDD (degenerative disc disease), cervical        PROCEDURES PERFORMED:  1. C3-6 anterior discectomy, decompression and arthrodesis. 2. C3-6 anterior instrumentation. 3. C3-6 insertion of Interbody biomechanical device. 4. Use of operative microscope. 5. Use of allograft bone for spinal fusion. SURGEON:  Adryan Singleton MD    FIRST ASSISTANT:  ABBY Fong  Ms. Wanda Hough has undergone a major surgery. Jun Marie knowledge about the pertinent anatomy, procedural steps and equipment requirent in this procedure was medically necessary to ensure the safety of the patient. His assistance has helped reduce surgical time by 35%. Surgical tasks included, but are not limited to:  1) Safe and proper retraction. 2) Maintenance of visualization during surgery by helping with tasks such as suctioning. 3) Multiple tasks throughout the decompression and instrumentation to allow for timely and safe completion of the procedure. 4) Overall assistance helped decrease the risk complications such as infection, bleeding, hardware malposition, and damage to surrounding structures. ANESTHESIA:  GETA. COMPLICATIONS:  None. SPECIMENS:  None. IMPLANTS:    1. [employer]ovis Fortibridge anterior cervical plate. 2. [employer] Forticore interbody biomechanical device. Please note that the anterior lumbar plate and the Interbody biomechanical device are completely separate and independent devices that function autonomously from each other. ESTIMATED BLOOD LOSS:  50 mL. DRAINS:  None. PRE-OP ANTIBIOTICS: Ancef. INDICATIONS FOR PROCEDURE:    Alberta Stallworth is a 67 y.o. female who has the above listed diagnosis. Ms. Mei Martin has failed to improve with non-operative treatment and has chosen to proceed with surgical intervention. We had a long conversation about pros and cons of surgery, risks, possible complications, alternative treatments, and Jacklyn Blake had an opportunity to ask questions and is satisfied with my answers and explanations. I have personally given warnings about possible complications including but not limited to death, permanent disability, heart attack, stroke, lung injury or infection, blindness, ileus, bladder or bowel problems, bleeding, blood vessel injury, nerve injury (including numbness, pain and weakness), paralysis (which may be permanent), failure to heal, failure to fuse bone together in fusion procedures, failure to relief symptoms, failure to relief pain, increased pain, need for further surgeries, failure or breakage or hardware, malpositioning of hardware, need to fuse or operate on additional levels determined either during or after surgery, destabilization of the spine (which may require fusion or later surgery), infections (which may or may not require additional surgery), dural tears (tears of the sac holding in nerves and spinal fluid), meningitis, blood clots, pulmonary embolus, Luis syndrome, recurrent disc herniation, and anesthetic complications. Comorbidities such as obesity, smoking, rheumatoid arthritis, chronic steroid use and diabetes increase these risks. Ms. Mei Martin  understands and wants to proceed.     NARRATIVE OF THE PROCEDURE:    After informed consent was obtained and Sharri Anderson had a chance to ask any last minute questions, the patient was transported to the operating room and underwent general endotracheal anesthesia. Sharri Anderson was positioned supine on the Adriano table and the body was properly padded. A bump was placed between the shoulder blades, the knees were gently bent with pillows, head halter was applied with 10 lbs of skin traction, and the shoulders were gently taped. Fluoroscopy was used to graham the level of the incision and the site was prepped and draped in the usual manner. A time out was obtained and we verified that we had the correct patient the correct site and that the proper IV antibiotics had been administered in accordance with SCIP protocol. A standard anterior approach to the cervical spine was performed. The trachea and esophagus were retracted medially and the carotid sheath laterally. The spine was exposed and lateral view fluoroscopy was used to confirm the correct level. The longus coli muscle was elevated (while protecting the sympathetic chain) and the uncinate process was exposed bilaterally. Self retaining retractor was placed and Augusta pins were placed with gentle axial traction. The operative microscope was brought into the field and was kept in place for the remainder of the procedure. A box annulotomy was performed at the C3-4 level. Discectomy was performed with a curette and a pituitary at the C3-4 level. Once the PLL was reached, a high speed drill was used to make the end plates co-planar and to remove the anterior/posterior osteophytes. A rent was found in the PLL with a micro nerve hook and the PLL was removed with a Kerrison #1 followed by a Kerrison #2. Anterior foraminotomies were performed and any structures casing neural impingement (herniated disc, osteophytes, etc) were removed from the C3-4 level to complete the decompression.   A trial was used to determine the size of the interbody biomechanical device. The interbody biomechanical device was packed with allograft bone and then inserted in the C3-4 level for the anterior fusion and insertion of interbody biomechanical device. The anterior cervical discectomy, decompression, insertion of interbody biomechanical device and anterior arthrodesis was repeated in the exact same manner from C4 to C6. Once the decompression, insertion of interbody biomechanical device and anterior fusion was completed. A completely separate and independent plate was selected and placed from C3 to C6. Bilateral screws were inserted from C3 to C6 and locked with the locking mechanism. This completes the anterior instrumentation. The wound was irrigated and hemostasis was obtained. A deep drain was placed. The platysma was closed with 2-0 vicryl, the subcutaneous tissues were closed with 3-0 vicryl and the skin with monocryl/dermabond. Final AP/lateral fluoroscopic images were obtained and saved to PACS. The patient was awakened and transferred to PACU in stable condition. POSTOPERATIVE PLAN:  The patient will have SCDs for DVT prophylaxis and IV antibiotics for infection prophylaxis. COUNTS: Sponge and needle counts were correct.     SIGNED BY:  Ivan Gregory MD     June 14, 2022

## 2022-06-14 NOTE — PERIOP NOTES
1344 room assignment pending, Mr. Christoph Estrella received post op update    [id number] room process of cleaning, Mr. Christoph Estrella updated    6026 see note report to floor, room in process of cleaning

## 2022-06-14 NOTE — PERIOP NOTES
Handoff Report from Operating Room to PACU    Report received from [address] [place] [address] and [place] [id number] regarding [address] [place] [address]  Surgeon(s):  Anna Hobson MD  And Procedure(s) (LRB):  ANTERIOR CERVICAL DISCECTOMY AND FUSION CERVICAL three TO CERVICAL six (N/A)  confirmed   with allergies, drains and dressings discussed. Anesthesia type, drugs, patient history, complications, estimated blood loss, vital signs, intake and output, and last pain medication and reversal medications were reviewed.

## 2022-06-14 NOTE — PERIOP NOTES
TRANSFER - OUT REPORT:    Verbal report given to Deshawn Sen RN  on [employer]  being transferred to [address] [place] [address] (unit) for routine post - op       Report consisted of patients Situation, Background, Assessment and   Recommendations(SBAR). Information from the following report(s) SBAR, OR Summary, Procedure Summary, Intake/Output, MAR and Cardiac Rhythm NSR was reviewed with the receiving nurse. Opportunity for questions and clarification was provided.       Patient transported with:   O2 @ 2 liters  Tech

## 2022-06-14 NOTE — ANESTHESIA PREPROCEDURE EVALUATION
Relevant Problems   No relevant active problems       Anesthetic History   No history of anesthetic complications            Review of Systems / Medical History  Patient summary reviewed, nursing notes reviewed and pertinent labs reviewed    Pulmonary  Within defined limits                 Neuro/Psych             Comments: Symptomatic spinal degenerative joint disease  Cardiovascular    Hypertension: well controlled              Exercise tolerance: >4 METS     GI/Hepatic/Renal     GERD: well controlled           Endo/Other  Within defined limits           Other Findings            Physical Exam    Airway  Mallampati: III  TM Distance: 4 - 6 cm  Neck ROM: decreased range of motion   Mouth opening: Diminished (comment)    Comments: Minimal range of motion in both neck flexion and extension secondary to neck pain. Constant bilateral hand numbness, but no motor deficits. Reports neurological evaluation remarkable for unsteady gait, but does not notice this herself.   Cardiovascular    Rhythm: regular  Rate: normal         Dental  No notable dental hx       Pulmonary  Breath sounds clear to auscultation               Abdominal  GI exam deferred       Other Findings            Anesthetic Plan    ASA: 2  Anesthesia type: general          Induction: Intravenous  Anesthetic plan and risks discussed with: Patient      Joaquin Yaneth for primary airway  Limit head and neck manipulation

## 2022-06-15 ENCOUNTER — APPOINTMENT (OUTPATIENT)
Dept: GENERAL RADIOLOGY | Age: 72
DRG: 472 | End: 2022-06-15
Attending: NURSE PRACTITIONER
Payer: MEDICARE

## 2022-06-15 VITALS
DIASTOLIC BLOOD PRESSURE: 55 MMHG | BODY MASS INDEX: 27.1 KG/M2 | RESPIRATION RATE: 18 BRPM | TEMPERATURE: 98.1 F | HEIGHT: 62 IN | WEIGHT: 147.27 LBS | SYSTOLIC BLOOD PRESSURE: 125 MMHG | OXYGEN SATURATION: 97 % | HEART RATE: 67 BPM

## 2022-06-15 PROCEDURE — 97535 SELF CARE MNGMENT TRAINING: CPT | Performed by: OCCUPATIONAL THERAPIST

## 2022-06-15 PROCEDURE — 72040 X-RAY EXAM NECK SPINE 2-3 VW: CPT

## 2022-06-15 PROCEDURE — 74011000250 HC RX REV CODE- 250: Performed by: ORTHOPAEDIC SURGERY

## 2022-06-15 PROCEDURE — 97530 THERAPEUTIC ACTIVITIES: CPT | Performed by: OCCUPATIONAL THERAPIST

## 2022-06-15 PROCEDURE — 97530 THERAPEUTIC ACTIVITIES: CPT

## 2022-06-15 PROCEDURE — 74011250636 HC RX REV CODE- 250/636: Performed by: ORTHOPAEDIC SURGERY

## 2022-06-15 PROCEDURE — 97116 GAIT TRAINING THERAPY: CPT

## 2022-06-15 PROCEDURE — 97165 OT EVAL LOW COMPLEX 30 MIN: CPT | Performed by: OCCUPATIONAL THERAPIST

## 2022-06-15 PROCEDURE — 74011250637 HC RX REV CODE- 250/637: Performed by: ORTHOPAEDIC SURGERY

## 2022-06-15 RX ORDER — ACETAMINOPHEN 500 MG
1000 TABLET ORAL EVERY 6 HOURS
Qty: 56 TABLET | Refills: 0 | Status: SHIPPED | OUTPATIENT
Start: 2022-06-15 | End: 2022-06-22

## 2022-06-15 RX ORDER — OXYCODONE HYDROCHLORIDE 5 MG/1
5 TABLET ORAL
Qty: 28 TABLET | Refills: 0 | Status: SHIPPED | OUTPATIENT
Start: 2022-06-15 | End: 2022-06-22

## 2022-06-15 RX ORDER — POLYETHYLENE GLYCOL 3350 17 G/17G
17 POWDER, FOR SOLUTION ORAL DAILY
Qty: 7 PACKET | Refills: 0 | Status: SHIPPED | OUTPATIENT
Start: 2022-06-16 | End: 2022-06-23

## 2022-06-15 RX ORDER — AMOXICILLIN 250 MG
1 CAPSULE ORAL 2 TIMES DAILY
Qty: 14 TABLET | Refills: 0 | Status: SHIPPED | OUTPATIENT
Start: 2022-06-15 | End: 2022-06-22

## 2022-06-15 RX ADMIN — FAMOTIDINE 20 MG: 20 TABLET ORAL at 08:32

## 2022-06-15 RX ADMIN — ACETAMINOPHEN 1000 MG: 500 TABLET ORAL at 05:37

## 2022-06-15 RX ADMIN — OXYCODONE 5 MG: 5 TABLET ORAL at 08:32

## 2022-06-15 RX ADMIN — ERGOCALCIFEROL 50000 UNITS: 1.25 CAPSULE ORAL at 08:33

## 2022-06-15 RX ADMIN — Medication 1000 UNITS: at 08:34

## 2022-06-15 RX ADMIN — HYDROCHLOROTHIAZIDE: 25 TABLET ORAL at 08:35

## 2022-06-15 RX ADMIN — WATER 2 G: 1 INJECTION INTRAMUSCULAR; INTRAVENOUS; SUBCUTANEOUS at 02:07

## 2022-06-15 RX ADMIN — ACETAMINOPHEN 1000 MG: 500 TABLET ORAL at 14:22

## 2022-06-15 RX ADMIN — POLYETHYLENE GLYCOL 3350 17 G: 17 POWDER, FOR SOLUTION ORAL at 08:30

## 2022-06-15 RX ADMIN — PANTOPRAZOLE SODIUM 40 MG: 40 TABLET, DELAYED RELEASE ORAL at 05:38

## 2022-06-15 RX ADMIN — CETIRIZINE HYDROCHLORIDE 10 MG: 10 TABLET, FILM COATED ORAL at 08:34

## 2022-06-15 RX ADMIN — MUPIROCIN: 20 OINTMENT TOPICAL at 08:36

## 2022-06-15 RX ADMIN — SODIUM CHLORIDE, PRESERVATIVE FREE 10 ML: 5 INJECTION INTRAVENOUS at 05:37

## 2022-06-15 RX ADMIN — OXYCODONE 5 MG: 5 TABLET ORAL at 05:37

## 2022-06-15 RX ADMIN — SENNOSIDES AND DOCUSATE SODIUM 1 TABLET: 50; 8.6 TABLET ORAL at 08:32

## 2022-06-15 RX ADMIN — Medication 1 TABLET: at 08:33

## 2022-06-15 RX ADMIN — OXYCODONE 5 MG: 5 TABLET ORAL at 14:22

## 2022-06-15 RX ADMIN — NEPHROCAP 1 CAPSULE: 1 CAP ORAL at 08:34

## 2022-06-15 RX ADMIN — OMEGA-3-ACID ETHYL ESTERS 1000 MG: 1 CAPSULE, LIQUID FILLED ORAL at 08:33

## 2022-06-15 RX ADMIN — GABAPENTIN 100 MG: 100 CAPSULE ORAL at 08:33

## 2022-06-15 RX ADMIN — MONTELUKAST 10 MG: 10 TABLET, FILM COATED ORAL at 08:32

## 2022-06-15 RX ADMIN — ZINC SULFATE 220 MG (50 MG) CAPSULE 1 CAPSULE: CAPSULE at 08:33

## 2022-06-15 RX ADMIN — Medication 400 MG: at 08:32

## 2022-06-15 NOTE — PROGRESS NOTES
Problem: Falls - Risk of  Goal: *Absence of Falls  Description: Document Wilbur Feliciano Fall Risk and appropriate interventions in the flowsheet.   6/15/2022 1659 by Deana Barlow RN  Outcome: Progressing Towards Goal  Note: Fall Risk Interventions:  Mobility Interventions: Bed/chair exit alarm         Medication Interventions: Bed/chair exit alarm    Elimination Interventions: Bed/chair exit alarm,Call light in reach    History of Falls Interventions: Bed/chair exit alarm      6/15/2022 1032 by Deana Barlow RN  Outcome: Progressing Towards Goal  Note: Fall Risk Interventions:  Mobility Interventions: Bed/chair exit alarm,Communicate number of staff needed for ambulation/transfer,Patient to call before getting OOB         Medication Interventions: Bed/chair exit alarm,Patient to call before getting OOB,Teach patient to arise slowly    Elimination Interventions: Bed/chair exit alarm,Call light in reach,Patient to call for help with toileting needs,Toileting schedule/hourly rounds    History of Falls Interventions: Bed/chair exit alarm,Consult care management for discharge planning,Evaluate medications/consider consulting pharmacy         Problem: Patient Education: Go to Patient Education Activity  Goal: Patient/Family Education  6/15/2022 1659 by Deana Barlow RN  Outcome: Progressing Towards Goal  6/15/2022 1032 by Deana Barlow RN  Outcome: Progressing Towards Goal     Problem: Patient Education: Go to Patient Education Activity  Goal: Patient/Family Education  6/15/2022 1659 by Deana Barlow RN  Outcome: Progressing Towards Goal  6/15/2022 1032 by Deana Barlow RN  Outcome: Progressing Towards Goal

## 2022-06-15 NOTE — PROGRESS NOTES
Ortho / Neurosurgery NP Note    POD# 1  s/p ANTERIOR CERVICAL DISCECTOMY AND FUSION CERVICAL three TO CERVICAL six   Pt seen with  at bedside. Pt sitting up in chair, in NAD. Recently seen by PT/OT with discussion of possible IP rehab to improve balance, strength and coordination. Patient states her symptoms are the same as pre-op. She reports PTA she was independent, walked household distances with no assistance or AD, and cared for 3 grandchildren daily. She feels she is functioning at her baseline and would prefer to return home with [facility]    C/o mandi shoulder pain and appropriate incisional pain, well controlled with oxycodone   Tolerating regular diet. No nausea. Strong, clear voice. VSS Afebrile. Room air. Visit Vitals  BP (!) 125/55   Pulse 67   Temp 98.1 °F (36.7 °C)   Resp 18   Ht 5' 2\" (1.575 m)   Wt 66.8 kg (147 lb 4.3 oz)   SpO2 97%   Breastfeeding No   BMI 26.94 kg/m²       Voiding status: voiding   Output (mL)  Urine Voided: 350 ml (06/14/22 2346)  Last Bowel Movement Date: 06/13/22 (06/15/22 0820)      Labs    Lab Results   Component Value Date/Time    HGB 12.4 06/07/2022 03:12 PM      Lab Results   Component Value Date/Time    INR 1.0 06/07/2022 03:12 PM      Lab Results   Component Value Date/Time    Sodium 140 06/07/2022 03:12 PM    Potassium 3.6 06/07/2022 03:12 PM    Chloride 107 06/07/2022 03:12 PM    CO2 31 06/07/2022 03:12 PM    Glucose 123 (H) 06/07/2022 03:12 PM    BUN 35 (H) 06/07/2022 03:12 PM    Creatinine 0.96 06/07/2022 03:12 PM    Calcium 9.6 06/07/2022 03:12 PM     Recent Glucose Results: No results found for: GLU, GLUPOC, GLUCPOC        Body mass index is 26.94 kg/m². : A BMI > 30 is classified as obesity and > 40 is classified as morbid obesity. Prineo dressing c.d.i - hard c. Collar in place  SHA drain - removed  Calves soft and supple;  No pain with passive stretch  SCDs for mechanical DVT proph while in bed     PLAN:  1) Neurovascular assessment q4 hours   2) PT/OT - hard cervical collar   3) Pain control - scheduled tylenol, prn oxycodone   4) Readniess for discharge:     [x] Vital Signs stable    [x] + Voiding    [x] Wound intact, drainage minimal    [x] Tolerating PO intake     [x] Cleared by PT (OT if applicable)     [x] Stair training completed (if applicable)    [x] Independent / Contact Guard Assist (household distance)     [x] Bed mobility     [x] Car transfers     [x] ADLs    [x] Adequate pain control on oral medication alone     Discharge home today with 's support and HH.      Estefani Benoit NP

## 2022-06-15 NOTE — DISCHARGE SUMMARY
Spine Discharge Summary    Patient ID:  Margi Cameron  [id number]  female  67 y.o.  1950    Admit date: 6/14/2022    Discharge date: 6/15/2022    Admitting Physician: Snow Bermudez MD     Consulting Physician(s):   Treatment Team: Attending Provider: Tyler Covarrubias MD; Utilization Review: Ahmet Green RN; Care Manager: Sandeep Davis Primary Nurse: Sandip Avila RN; Physical Therapy Assistant: Yael Donovan PTA; Occupational Therapist: Lorena eLe OTR/L    Date of Surgery:   6/14/2022     Preoperative Diagnosis:  Cervicalgia [M54.2]  Radiculopathy, cervical [M54.12]  DDD (degenerative disc disease), cervical [M50.30]  Spinal stenosis in cervical region [M48.02]  Foraminal stenosis of cervical region [M48.02]  Cervical spondylosis with myelopathy [M47.12]  Numbness of hand [R20.0]    Postoperative Diagnosis:   Cervicalgia [M54.2]  Radiculopathy, cervical [M54.12]  DDD (degenerative disc disease), cervical [M50.30]  Spinal stenosis in cervical region [M48.02]  Foraminal stenosis of cervical region [M48.02]  Cervical spondylosis with myelopathy [M47.12]  Numbness of hand [R20.0]    Procedure(s):  ANTERIOR CERVICAL DISCECTOMY AND FUSION CERVICAL three TO CERVICAL six     Anesthesia Type: Other     Surgeon: Tyler Covarrubias MD                            HPI:  Pt is a 67 y.o. female who has a history of Cervicalgia [M54.2]  Radiculopathy, cervical [M54.12]  DDD (degenerative disc disease), cervical [M50.30]  Spinal stenosis in cervical region [M48.02]  Foraminal stenosis of cervical region [M48.02]  Cervical spondylosis with myelopathy [M47.12]  Numbness of hand [R20.0]  with pain and limitations of activities of daily living who presents at this time for a ANTERIOR CERVICAL DISCECTOMY AND FUSION CERVICAL three TO CERVICAL six  following the failure of conservative management.     PMH:   Past Medical History:   Diagnosis Date    GERD (gastroesophageal reflux disease)     Hypertension     Kidney stones     Menopause     Osteopenia     Osteoporosis     Spinal stenosis     Vitamin D deficiency        Body mass index is 26.94 kg/m². : A BMI > 30 is classified as obesity and > 40 is classified as morbid obesity. Medications upon admission :   Prior to Admission Medications   Prescriptions Last Dose Informant Patient Reported? Taking? Omega-3-DHA-EPA-Fish Oil (Fish OiL) 1,200 (144-216) mg cap 6/7/2022 at Unknown time  Yes Yes   Sig: Take 1 Capsule by mouth daily. Omeprazole delayed release (PRILOSEC D/R) 20 mg tablet 6/13/2022 at Unknown time  Yes Yes   Sig: Take 20 mg by mouth daily. alendronate (FOSAMAX) 70 mg tablet 6/7/2022  Yes No   Sig: Take 70 mg by mouth every seven (7) days. cetirizine (ZyrTEC) 10 mg tablet 6/7/2022 at Unknown time  Yes Yes   Sig: Take 10 mg by mouth daily. cyclobenzaprine (FLEXERIL) 10 mg tablet 5/14/2022 at Unknown time  Yes Yes   Sig: Take 10 mg by mouth three (3) times daily as needed for Muscle Spasm(s). ergocalciferol (Vitamin D2) 1,250 mcg (50,000 unit) capsule 6/8/2022  Yes No   Sig: Take 50,000 Units by mouth every seven (7) days. ferrous fumarate/vit Bcomp,C (SUPER B COMPLEX PO) 6/7/2022 at Unknown time  Yes Yes   Sig: Take 1 Tablet by mouth daily. glucosamine sulfate (GLUCOSAMINE PO) 6/7/2022 at Unknown time  Yes Yes   Sig: Take 2 Tablets by mouth daily. lisinopril-hydroCHLOROthiazide (PRINZIDE, ZESTORETIC) 10-12.5 mg per tablet 6/13/2022 at Unknown time  Yes Yes   Sig: Take 1 Tablet by mouth daily. magnesium 250 mg tab 6/7/2022 at Unknown time  Yes Yes   Sig: Take 1 Tablet by mouth daily. meloxicam (MOBIC) 15 mg tablet 6/7/2022 at Unknown time  Yes Yes   Sig: Take 15 mg by mouth daily. montelukast (SINGULAIR) 10 mg tablet 6/7/2022 at Unknown time  Yes Yes   Sig: Take 10 mg by mouth daily. multivit with calcium,iron,min (DAILY VITAMINS FOR WOMEN PO) 6/7/2022 at Unknown time  Yes Yes   Sig: Take 1 Tablet by mouth daily.    mupirocin (BACTROBAN) 2 % ointment 6/13/2022 at Unknown time  No Yes   Sig: by Both Nostrils route two (2) times a day for 5 days. mupirocin (BACTROBAN) 2 % ointment   Yes No   Sig: by Both Nostrils route two (2) times a day. traMADoL (ULTRAM) 50 mg tablet 6/13/2022 at Unknown time  Yes Yes   Sig: Take 50 mg by mouth every six (6) hours as needed for Pain.   vitamin e (E GEMS) 1,000 unit capsule 6/7/2022 at Unknown time  Yes Yes   Sig: Take 1,000 Units by mouth daily. zinc 50 mg tab tablet 6/7/2022 at Unknown time  Yes Yes   Sig: Take 50 mg by mouth daily. Facility-Administered Medications: None        Allergies: Allergies   Allergen Reactions    Calcium Swelling and Other (comments)     \"my mouth swells up and bleeds\"    Copper Itching        Hospital Course: The patient underwent surgery. Complications:  None; patient tolerated the procedure well. Was taken to the PACU in stable condition and then transferred to the ortho floor. Perioperative Antibiotics:  Ancef     Postoperative Pain Management:  Oxycodone & Tylenol     Postoperative transfusions:    Number of units banked PRBCs =   none     Post Op complications: none    Hemoglobin at discharge:    Lab Results   Component Value Date/Time    HGB 12.4 06/07/2022 03:12 PM    INR 1.0 06/07/2022 03:12 PM       Dressing remained clean, dry and intact. No significant erythema or swelling. Wound appears to be healing without any evidence of infection. Pt had a SHA drain that was removed on POD# 1. Neurovascular exam found to be within normal limits. Physical Therapy started following surgery and participated in bed mobility, transfers and ambulation.         Gait:  Gait  Base of Support: Widened  Speed/Neida: Shuffled,Slow  Step Length: Left shortened,Right shortened  Gait Abnormalities: Decreased step clearance,Path deviations  Ambulation - Level of Assistance: Minimal assistance  Distance (ft): 20 Feet (ft) (20ft x2)  Assistive Device: Gait belt (B hand held assist. Unable to hold RW with BUEs 2/2  poo)                   Discharged to: Home with [facility]. Condition on Discharge:   stable    Discharge instructions:  - Take pain medications as prescribed  - Resume pre hospital diet      - Discharge activity: activity as tolerated  - Ambulate as tolerated  - Avoid bending, lifting and twisting  - Cervical Collar  - Wound Care Keep wound clean and dry. See discharge instruction sheet. -DISCHARGE MEDICATION LIST     Current Discharge Medication List      START taking these medications    Details   acetaminophen (TYLENOL) 500 mg tablet Take 2 Tablets by mouth every six (6) hours for 7 days. Qty: 56 Tablet, Refills: 0  Start date: 6/15/2022, End date: 6/22/2022      oxyCODONE IR (ROXICODONE) 5 mg immediate release tablet Take 1 Tablet by mouth every six (6) hours as needed for Pain for up to 7 days. Max Daily Amount: 20 mg.  Qty: 28 Tablet, Refills: 0  Start date: 6/15/2022, End date: 6/22/2022    Associated Diagnoses: S/P cervical spinal fusion      polyethylene glycol (MIRALAX) 17 gram packet Take 1 Packet by mouth daily for 7 days. Qty: 7 Packet, Refills: 0  Start date: 6/16/2022, End date: 6/23/2022      senna-docusate (PERICOLACE) 8.6-50 mg per tablet Take 1 Tablet by mouth two (2) times a day for 7 days. Qty: 14 Tablet, Refills: 0  Start date: 6/15/2022, End date: 6/22/2022         CONTINUE these medications which have NOT CHANGED    Details   Omeprazole delayed release (PRILOSEC D/R) 20 mg tablet Take 20 mg by mouth daily. lisinopril-hydroCHLOROthiazide (PRINZIDE, ZESTORETIC) 10-12.5 mg per tablet Take 1 Tablet by mouth daily. montelukast (SINGULAIR) 10 mg tablet Take 10 mg by mouth daily. magnesium 250 mg tab Take 1 Tablet by mouth daily. zinc 50 mg tab tablet Take 50 mg by mouth daily. multivit with calcium,iron,min (DAILY VITAMINS FOR WOMEN PO) Take 1 Tablet by mouth daily.       Omega-3-DHA-EPA-Fish Oil (Fish OiL) 1,200 (144-216) mg cap Take 1 Capsule by mouth daily. vitamin e (E GEMS) 1,000 unit capsule Take 1,000 Units by mouth daily. ferrous fumarate/vit Bcomp,C (SUPER B COMPLEX PO) Take 1 Tablet by mouth daily. glucosamine sulfate (GLUCOSAMINE PO) Take 2 Tablets by mouth daily. cetirizine (ZyrTEC) 10 mg tablet Take 10 mg by mouth daily. cyclobenzaprine (FLEXERIL) 10 mg tablet Take 10 mg by mouth three (3) times daily as needed for Muscle Spasm(s). ergocalciferol (Vitamin D2) 1,250 mcg (50,000 unit) capsule Take 50,000 Units by mouth every seven (7) days. alendronate (FOSAMAX) 70 mg tablet Take 70 mg by mouth every seven (7) days.          STOP taking these medications       mupirocin (BACTROBAN) 2 % ointment Comments:   Reason for Stopping:         meloxicam (MOBIC) 15 mg tablet Comments:   Reason for Stopping:         traMADoL (ULTRAM) 50 mg tablet Comments:   Reason for Stopping:         mupirocin (BACTROBAN) 2 % ointment Comments:   Reason for Stopping:            per medical continuation form      -Follow up in office in 2 weeks      Signed:  Jung Nguyen NP  Orthopaedic Nurse Practitioner    6/15/2022  1:20 PM

## 2022-06-15 NOTE — PROGRESS NOTES
ORTHO POST OP SPINE PROGRESS NOTE    Elen 15, 2022  Admit Date: 2022  Admit Diagnosis: Cervicalgia [M54.2]  Radiculopathy, cervical [M54.12]  DDD (degenerative disc disease), cervical [M50.30]  Spinal stenosis in cervical region [M48.02]  Foraminal stenosis of cervical region [M48.02]  Cervical spondylosis with myelopathy [M47.12]  Numbness of hand [R20.0]  Cervical stenosis of spine [M48.02]  Procedure: Procedure(s):  ANTERIOR CERVICAL DISCECTOMY AND FUSION CERVICAL three TO CERVICAL six  Post Op day: 1 Day Post-Op    Subjective:     Hiram Rockwell is a patient who has complaints of pain in the neck, BUE and numbness in her hands s/p C3-6 ACDF.  at bedside. tolerating po and able to void. .     Review of Systems: Pertinent items are noted in HPI. Objective:     PT/OT:   Distance Ambulated:           Time Ambulated (min):        Ambulation Response: Activity Response: Tolerated well  Assistive Device:              Assistive Device: Fall prevention device,Walker (comment)    Vital Signs:    Blood pressure 133/70, pulse 68, temperature 98.1 °F (36.7 °C), resp. rate 18, height 5' 2\" (1.575 m), weight 66.8 kg (147 lb 4.3 oz), SpO2 97 %, not currently breastfeeding. Temp (24hrs), Av.1 °F (36.7 °C), Min:97.9 °F (36.6 °C), Max:98.7 °F (37.1 °C)      No intake/output data recorded.  1901 - 06/15 0700  In: 1809.6 [I.V.:1809.6]  Out: 1450 [Urine:1350; Drains:50]    LAB:    No results for input(s): HGB, HGBEXT, WBC, PLT, PLTEXT, HGBEXT, PLTEXT in the last 72 hours. Wound/Drain Assessment:  Drain:      Dressing:     Physical Exam:  Neurological: no deficit  Incision clean, dry, and intact  5/5 BUE    Assessment:      Patient Active Problem List   Diagnosis Code    Cervical stenosis of spine M48.02       Plan:     Continue PT/OT/Rehab  Discontinue: IV  Consult: PT  and OT    Discharge To: HOME   S/p C3-6 ACDF - PT/OT, Aspen collar, WBAT  Sched tylenol, prn oxy  D/c home.  Today pending therapy clearance

## 2022-06-15 NOTE — PROGRESS NOTES
Problem: Falls - Risk of  Goal: *Absence of Falls  Description: Document Evangelina Boston Fall Risk and appropriate interventions in the flowsheet.   Outcome: Progressing Towards Goal  Note: Fall Risk Interventions:  Mobility Interventions: Bed/chair exit alarm,Communicate number of staff needed for ambulation/transfer,Patient to call before getting OOB         Medication Interventions: Bed/chair exit alarm,Patient to call before getting OOB,Teach patient to arise slowly    Elimination Interventions: Bed/chair exit alarm,Call light in reach,Patient to call for help with toileting needs,Toileting schedule/hourly rounds    History of Falls Interventions: Bed/chair exit alarm,Consult care management for discharge planning,Evaluate medications/consider consulting pharmacy         Problem: Patient Education: Go to Patient Education Activity  Goal: Patient/Family Education  Outcome: Progressing Towards Goal     Problem: Patient Education: Go to Patient Education Activity  Goal: Patient/Family Education  Outcome: Progressing Towards Goal

## 2022-06-15 NOTE — PROGRESS NOTES
Problem: Mobility Impaired (Adult and Pediatric)  Goal: *Acute Goals and Plan of Care (Insert Text)  Description: FUNCTIONAL STATUS PRIOR TO ADMISSION: Pt independent household and short community distances without device PTA. Pt reports adjusting tasks at home due to poor  strength. HOME SUPPORT PRIOR TO ADMISSION: Pt live with family in 1 story home with steps to enter. Will need assist upon DC due to poor . Physical Therapy Goals  Initiated 6/14/2022  1. Patient will move from supine to sit and sit to supine , scoot up and down, and roll side to side in bed with supervision/set-up within 7 day(s). 2.  Patient will transfer from bed to chair and chair to bed with supervision/set-up using the least restrictive device within 7 day(s). 3.  Patient will perform sit to stand with supervision/set-up within 7 day(s). 4.  Patient will ambulate with minimal assistance/contact guard assist for 100 feet with the least restrictive device within 7 day(s). 5.  Patient will ascend/descend 4 stairs with B handrail(s) with minimal assistance/contact guard assist within 7 day(s).    Outcome: Progressing Towards Goal   PHYSICAL THERAPY TREATMENT  Patient: Bailee William (63 y.o. female)  Date: 6/15/2022  Diagnosis: Cervicalgia [M54.2]  Radiculopathy, cervical [M54.12]  DDD (degenerative disc disease), cervical [M50.30]  Spinal stenosis in cervical region [M48.02]  Foraminal stenosis of cervical region [M48.02]  Cervical spondylosis with myelopathy [M47.12]  Numbness of hand [R20.0]  Cervical stenosis of spine [M48.02] <principal problem not specified>  Procedure(s) (LRB):  ANTERIOR CERVICAL DISCECTOMY AND FUSION CERVICAL three TO CERVICAL six (N/A) 1 Day Post-Op  Precautions:   No bending, no lifting greater than 5 lbs, no twisting, log-roll technique, repositioning every 20-30 min except when sleeping, brace when OOB (if ordered)  Chart, physical therapy assessment, plan of care and goals were reviewed. ASSESSMENT  Patient continues with skilled PT services and is progressing towards goals. Pt presents with decreased strength and stability. Pt performed bed mobility independently. Pt performed sit to stand transfer at [facility]. Pt ambulated 20ft and 300ft min A/CGA with HHA x1. Pt unsteady and attempted a SPC and a SBQC ,but pt with difficulty coordination with UEs due to decreased strength. Pt and family educated on someone holding on to her at all times with mobilizing due to decreased balance. Both pt and  with understanding. Pt ascended/descended 4 steps with left railing and HHA on the right. Pt requiring cueing to feel with feet due to inability to look down. Pt performed a car transfer at [facility] with cueing for sequencing. Pt educated on option of rehab and how it could benefit her balance and strength to improve safety. Pt declined stating she will be fine and wanted to go to OP therapy. Current Level of Function Impacting Discharge (mobility/balance): bed mobility independently, sit to stand transfer at [facility], ambulation at min A/CGA with HHA x1. Other factors to consider for discharge: decreased stability and balance, good family support          PLAN :  Patient continues to benefit from skilled intervention to address the above impairments. Continue treatment per established plan of care. to address goals. Recommendation for discharge: (in order for the patient to meet his/her long term goals)  Therapy 3 hours per day 5-7 days per week but pt declines, HHPT with 24/7 supervision     This discharge recommendation:  Has been made in collaboration with the attending provider and/or case management    IF patient discharges home will need the following DME: gait belt       SUBJECTIVE:   Patient stated  I think I'm going to be okay I've been unsteady for a while.     OBJECTIVE DATA SUMMARY:   Critical Behavior:  Neurologic State: Alert,Appropriate for age  Orientation Level: Oriented X4  Cognition: Appropriate decision making,Appropriate for age attention/concentration,Appropriate safety awareness,Follows commands       Spinal diagnosis intervention:  The patient stated 3/3 back precautions when prompted. Reviewed all 3 back precautions, log roll technique, and sitting for 30 minutes at a time. Functional Mobility Training:    Bed Mobility:  Log Rolling: Independent  Supine to Sit: Independent     Scooting: Independent        Transfers:  Sit to Stand: Contact guard assistance  Stand to Sit: Contact guard assistance                             Balance:  Sitting: Impaired  Sitting - Static: Good (unsupported)  Sitting - Dynamic: Fair (occasional)  Standing: Impaired; With support  Standing - Static: Fair;Constant support  Standing - Dynamic : Occasional;Fair;Constant support  Ambulation/Gait Training:  Distance (ft): 300 Feet (ft)  Assistive Device: Gait belt (HHA x1)  Ambulation - Level of Assistance: Minimal assistance;Contact guard assistance        Gait Abnormalities: Decreased step clearance;Trunk sway increased; Path deviations        Base of Support: Widened     Speed/Neida: Slow  Step Length: Right shortened;Left shortened           Stairs:  Number of Stairs Trained: 4  Stairs - Level of Assistance: Minimum assistance (HHA x1)   Rail Use: Left  (HHA x1)    Pain Rating:  Pt reported increased pain of LUE    Activity Tolerance:   Fair and requires rest breaks    After treatment patient left in no apparent distress:   Sitting in chair, Call bell within reach, and Caregiver / family present    COMMUNICATION/COLLABORATION:   The patients plan of care was discussed with: Occupational therapist and Registered nurse.      Yariel Zambrano PTA   Time Calculation: 38 mins

## 2022-06-15 NOTE — PROGRESS NOTES
OCCUPATIONAL THERAPY EVALUATION/DISCHARGE  Patient: Hiram Rockwell (16 y.o. female)  Date: 6/15/2022  Primary Diagnosis: Cervicalgia [M54.2]  Radiculopathy, cervical [M54.12]  DDD (degenerative disc disease), cervical [M50.30]  Spinal stenosis in cervical region [M48.02]  Foraminal stenosis of cervical region [M48.02]  Cervical spondylosis with myelopathy [M47.12]  Numbness of hand [R20.0]  Cervical stenosis of spine [M48.02]  Procedure(s) (LRB):  ANTERIOR CERVICAL DISCECTOMY AND FUSION CERVICAL three TO CERVICAL six (N/A) 1 Day Post-Op   Precautions: Cervical spine precautions and falls       ASSESSMENT  Based on the objective data described below, the patient presents with impaired FM and GM coordination, impaired light touch and proprioception in B hands, decreased activity tolerance, decreased insight into the severity of her deficits, decreased safety awareness and decreased standing balance which is impairing her functional independence. At this time the patient is functioning below her independent to mod I baseline, now performing ADLs at a min A to mod A level and she is CGA for transfers and ambulation. She would benefit from inpatient rehab after discharge, but she is declining to go. She is insistent on attending out patient PT after discharge only. Will keep patient's OT order open and establish goals/plan of care tomorrow if patient remains in the hospital overnight, as she would benefit from acute OT services. The patient currently has a discharge order. Functional Outcome Measure: The patient scored 55/100 on the Barthel Index outcome measure which is indicative of a 45% decline in function.          PLAN :  Recommendation for discharge: (in order for the patient to meet his/her long term goals)  Therapy 3 hours per day 5-7 days per week    Equipment recommendations for successful discharge: None       OBJECTIVE DATA SUMMARY:   HISTORY:   Past Medical History:   Diagnosis Date    GERD (gastroesophageal reflux disease)     Hypertension     Kidney stones     Menopause     Osteopenia     Osteoporosis     Spinal stenosis     Vitamin D deficiency      Past Surgical History:   Procedure Laterality Date    HX HYSTERECTOMY      HX LITHOTRIPSY      x 2    HX OOPHORECTOMY         Prior Level of Function/Environment/Context: Patient reports being independent to mod I for ADLs and IADLs, and she has been ambulating independently. She further reports having experienced a progressive decline in her FM coordination, B hand sensation and B hand strength, making it difficult for her to feed herself without occasionally dropping things and that she is having difficult with fasteners during dressing ADLs. Patient's  also reports that she has had a decline in her standing balance. Expanded or extensive additional review of patient history:   Home Situation  Home Environment: Private residence  # Steps to Enter: 3  Rails to Enter: Yes  Hand Rails : Left  One/Two Story Residence: One story  Living Alone: No  Support Systems: Spouse/Significant Other  Patient Expects to be Discharged to[de-identified] Home with home health  Current DME Used/Available at Home: [employer]ove, rollator  Tub or Shower Type: Tub/Shower combination    Hand dominance: Right    EXAMINATION OF PERFORMANCE DEFICITS:  Cognitive/Behavioral Status:  Neurologic State: Alert  Orientation Level: Oriented X4  Cognition: Follows commands; Impaired decision making;Poor safety awareness; Appropriate decision making     Perseveration: No perseveration noted  Safety/Judgement: Decreased insight into deficits; Decreased awareness of need for safety    Hearing:   Auditory  Auditory Impairment: None  Hearing Aids/Status: Does not own    Vision/Perceptual:    Acuity: Within Defined Limits         Range of Motion:  AROM: Generally decreased, functional    Strength:  Strength: Generally decreased, functional  Coordination:  Coordination: Grossly decreased, non-functional (hands,generally decreased/functional BLEs, UEs except hands)  Fine Motor Skills-Upper: Left Impaired;Right Impaired    Gross Motor Skills-Upper: Left Impaired;Right Impaired    Tone & Sensation:  Tone: Normal  Sensation: Impaired (dulled sensation and impaired proprioception)    Balance:  Sitting: Impaired  Sitting - Static: Good (unsupported)  Sitting - Dynamic: Fair (occasional)  Standing: Impaired; With support  Standing - Static: Fair;Occasional  Standing - Dynamic : Fair;Constant support    Functional Mobility and Transfers for ADLs:  Bed Mobility:  Presented up in chair    Transfers:  Sit to Stand: Contact guard assistance  Stand to Sit: Contact guard assistance  Bed to Chair: Contact guard assistance (ambulating HHA)  Bathroom Mobility: Contact guard assistance (ambulating HHA)  Toilet Transfer : Contact guard assistance    ADL Assessment:  Feeding: Minimum assistance    Oral Facial Hygiene/Grooming: Minimum assistance    Bathing: Minimum assistance    Upper Body Dressing: Minimum assistance    Lower Body Dressing: Moderate assistance    Toileting: Moderate assistance                ADL Intervention and task modifications:       Grooming  Position Performed: Standing  Washing Hands: Contact guard assistance    Upper Body Dressing Assistance  Front Opened Shirt: Minimum assistance; Compensatory technique training (unable to manage buttons)  Cues: Verbal cues provided;Visual cues provided; Tactile cues provided    Lower Body Dressing Assistance  Underpants: Moderate assistance  Pants With Elastic Waist: Moderate assistance  Socks: Minimum assistance  Slip on Shoes with Back: Supervision;Set-up  Leg Crossed Method Used: Yes  Position Performed: Seated in chair;Standing (posterior LOB occured when standing. )  Cues: Verbal cues provided;Visual cues provided    Showering: Instructed patient to wear c-collar when showering to ensure adherence to post op c-spine precautions, and to have family member change c-collar pads upon completion. Patient verbalized full understanding. Upper Body Dressing:  Patient instructed to perform donning of pull over shirt or button down shirt in front of mirror for visual feedback to ensure she is being adherent to c-spine precautions. Further instructed patient to jarad pullover shirt by bringing collar of shirt overhead and once head is through collar to then place each UE through the sleeve of the shirt. Instructed patient to doff pull over shirt by removing each UE from the sleeve before pulling off over head. Patient able to perform with donning of open front shirt with min A.   Cervical Collar Training:   Instructed patient on donning/doffing of c-collar, detaching only one side of collar. Patient and her  were instructed on proper donning/doffing of c-collar when fully detaching front from back. Patient's  was instructed on proper removal and replacement of c-collar pads via demonstration. Upon completion of all c-collar training provided, patient and her  were able to verbalize full understanding. Patient requires max A to total A for c-collar management. Cognitive Retraining  Safety/Judgement: Decreased insight into deficits; Decreased awareness of need for safety    Functional Measure:  Barthel Index:    Bathin  Bladder: 10  Bowels: 10  Groomin  Dressin  Feedin  Mobility: 10  Stairs: 5  Toilet Use: 5  Transfer (Bed to Chair and Back): 10  Total: 55/100        The Barthel ADL Index: Guidelines  1. The index should be used as a record of what a patient does, not as a record of what a patient could do. 2. The main aim is to establish degree of independence from any help, physical or verbal, however minor and for whatever reason. 3. The need for supervision renders the patient not independent. 4. A patient's performance should be established using the best available evidence.  Asking the patient, friends/relatives and nurses are the usual sources, but direct observation and common sense are also important. However direct testing is not needed. 5. Usually the patient's performance over the preceding 24-48 hours is important, but occasionally longer periods will be relevant. 6. Middle categories imply that the patient supplies over 50 per cent of the effort. 7. Use of aids to be independent is allowed. Monet Freedman., Barthel, D.W. ([id number]). Functional evaluation: the Barthel Index. [address] [facility] (14)2. Gilda Anderson ryland MATT Bunch, Jeanna Frankel., Tereso Malin., Tylene Chain, [address] [facility] (1999). Measuring the change indisability after inpatient rehabilitation; comparison of the responsiveness of the Barthel Index and Functional [place]ttrick Measure. Journal of Neurology, Neurosurgery, and Psychiatry, 66(4), 457-307. Ramona Morgan, NClydeJ.MELLISSA, EMERY Grace, & Hipolito Daniels MSUNDAR. (2004.) Assessment of post-stroke quality of life in cost-effectiveness studies: The usefulness of the Barthel Index and the EuroQoL-5D. Quality of Life Research, 13, 427-43      Pain Rating:  Cervical spine surgical site pain    Activity Tolerance:   Fair      After treatment patient left in no apparent distress:    Sitting in chair, Call bell within reach and Caregiver / family present    COMMUNICATION/EDUCATION:   The patients plan of care was discussed with: Physical Therapy Assistant, Registered Nurse and NP.     Thank you for this referral.  Wesley Harper, OTR/L  Time Calculation: 49 mins

## 2022-06-15 NOTE — PROGRESS NOTES
Transition of Care Plan:  RUR: 4% low   Disposition: home with home health   Follow up appointments: ortho  DME needed: owns dme needed   Transportation at Discharge: - at bedside  Keys or means to access home: yes   Medicare Letter: provided  Is patient a BCPI-A Bundle: no          If yes, was Bundle Letter given?:    Is patient a  and connected with the [place]? no               If yes, was [employer] transfer form completed and [facility] notified? Caregiver Contact:  Raquel Napier [phone]  Discharge Caregiver contacted prior to discharge? Yes- at bedside  Care Conference needed?: no    Reason for Admission: ANTERIOR CERVICAL DISCECTOMY AND FUSION CERVICAL three TO CERVICAL six                   RUR Score:  4%                   Plan for utilizing home health:  Per recommendation     PCP: First and Last name:  Armando Rodriguez NP   Name of Practice: [address] [place] [address] Internist    Are you a current patient: Yes/No: yes   Approximate date of last visit:    Can you participate in a virtual visit with your PCP: yes                    Current Advanced Directive/Advance Care Plan: patient reported she has an ACP at home that lists  as POA. .Advance Care Planning   Healthcare Decision Maker: rachel Napier               Transition of Care Plan:                      CM made room visit with patient and  at bedside. Pt confirmed demographics, insurance, and emergency contact on file. Pt and  live in a single level home with 3 qi. Pt has a RW and wheelchair. At baseline pt is independent with ADLs and driving. Pt has no hx of HH, SNF, or IPR. Pt's plan is to d/c home with home health. FOC signed for at home care and placed on bedside chart. Referral sent to at home care.  to provide transport at [place]. Care Management Interventions  PCP Verified by CM: Yes  Mode of Transport at Discharge:  Other (see comment) ( )  Transition of Care Consult (CM Consult): Discharge Planning,Home Health  Discharge Durable Medical Equipment: No  Physical Therapy Consult: Yes  Occupational Therapy Consult: Yes  Speech Therapy Consult: No  Support Systems: Spouse/Significant Other  Confirm Follow Up Transport: Family  The Plan for Transition of Care is Related to the Following Treatment Goals : hh  The Patient and/or Patient Representative was Provided with a Choice of Provider and Agrees with the Discharge Plan?: Yes  Freedom of Choice List was Provided with Basic Dialogue that Supports the Patient's Individualized Plan of Care/Goals, Treatment Preferences and Shares the Quality Data Associated with the Providers?: Yes  Discharge Location  Patient Expects to be Discharged to[de-identified] Home with home health    Roylene Hashimoto, [address] [facility], [address]

## 2022-06-15 NOTE — PROGRESS NOTES
DISCHARGE NOTE FROM [facility]    Patient determined to be stable for discharge by attending provider. I have reviewed the discharge instructions with the patient and spouse. They verbalized understanding and all questions were answered to their satisfaction. No complaints or further questions were expressed. Medications sent to pharmacy. Appropriate educational materials and medication side effect teaching were provided. PIV were removed prior to discharge. Patient did not discharge with any line, rubin, or drain. Personal items and valuables accounted for at discharge by patient and/or family: YES    Post-op patient: Yes- Patient given post-op discharge kit and instructed on use.        Parker Resendiz, RN

## 2022-06-15 NOTE — PROGRESS NOTES
DISCHARGE NOTE FROM [facility]    Patient determined to be stable for discharge by attending provider. I have reviewed the discharge instructions with the patient and spouse. They verbalized understanding and all questions were answered to their satisfaction. No complaints or further questions were expressed. Medications sent to pharmacy. Appropriate educational materials and medication side effect teaching were provided. PIV were removed prior to discharge. Patient did not discharge with any line, rubin, or drain. Personal items and valuables accounted for at discharge by patient and/or family: YES    Post-op patient: Yes- Patient given post-op discharge kit and instructed on use.        Cihng Vasquez RN

## 2022-06-15 NOTE — PROGRESS NOTES
End of Shift Note    Bedside shift change report given to DANDRE Ferrara (oncoming nurse) by Luis Leavitt RN (offgoing nurse). Report included the following information SBAR, Kardex, OR Summary, Procedure Summary, Intake/Output, MAR, Recent Results and Med Rec Status    Shift worked:  7p-7a     Shift summary and any significant changes:          Concerns for physician to address:       Zone phone for oncoming shift:   [phone]       Activity:  Activity Level: Up with Assistance  Number times ambulated in hallways past shift: 0  Number of times OOB to chair past shift: 0    Cardiac:   Cardiac Monitoring: No      Cardiac Rhythm: Sinus Nino    Access:   Current line(s): PIV     Genitourinary:   Urinary status: voiding    Respiratory:   O2 Device: None (Room air)  Chronic home O2 use?: NO  Incentive spirometer at bedside: YES  Actual Volume (ml): 1250 ml    GI:  Last Bowel Movement Date: 06/13/22  Current diet:  ADULT DIET Regular  Passing flatus: YES  Tolerating current diet: YES       Pain Management:   Patient states pain is manageable on current regimen: YES    Skin:  Tian Score: 19  Interventions: float heels, increase time out of bed and PT/OT consult    Patient Safety:  Fall Score:  Total Score: 4  Interventions: assistive device (walker, cane, etc), gripper socks, pt to call before getting OOB and stay with me (per policy)  High Fall Risk: Yes    Length of Stay:  Expected LOS: - - -  Actual LOS: 1      Muna Tesfaye RN

## 2022-10-19 ENCOUNTER — HOSPITAL ENCOUNTER (OUTPATIENT)
Dept: MAMMOGRAPHY | Age: 72
Discharge: HOME | End: 2022-10-19
Attending: NURSE PRACTITIONER
Payer: MEDICARE

## 2022-10-19 DIAGNOSIS — Z12.31 VISIT FOR SCREENING MAMMOGRAM: ICD-10-CM

## 2022-10-19 PROCEDURE — 77063 BREAST TOMOSYNTHESIS BI: CPT

## 2022-11-02 ENCOUNTER — HOSPITAL ENCOUNTER (OUTPATIENT)
Dept: GENERAL RADIOLOGY | Age: 72
Discharge: HOME | End: 2022-11-02
Payer: MEDICARE

## 2022-11-02 ENCOUNTER — TRANSCRIBE ORDER (OUTPATIENT)
Dept: REGISTRATION | Age: 72
End: 2022-11-02

## 2022-11-02 DIAGNOSIS — R07.81 PLEURITIC CHEST PAIN: ICD-10-CM

## 2022-11-02 DIAGNOSIS — R07.81 PLEURITIC CHEST PAIN: Primary | ICD-10-CM

## 2022-11-02 PROCEDURE — 71111 X-RAY EXAM RIBS/CHEST4/> VWS: CPT

## 2022-12-02 ENCOUNTER — TRANSCRIBE ORDER (OUTPATIENT)
Dept: SCHEDULING | Age: 72
End: 2022-12-02

## 2022-12-02 DIAGNOSIS — G45.9 TRANSIENT CEREBRAL ISCHEMIC ATTACK, UNSPECIFIED: Primary | ICD-10-CM

## 2022-12-20 ENCOUNTER — HOSPITAL ENCOUNTER (OUTPATIENT)
Dept: ULTRASOUND IMAGING | Age: 72
Discharge: HOME | End: 2022-12-20
Attending: NURSE PRACTITIONER
Payer: MEDICARE

## 2022-12-20 ENCOUNTER — HOSPITAL ENCOUNTER (OUTPATIENT)
Dept: CT IMAGING | Age: 72
Discharge: HOME | End: 2022-12-20
Attending: NURSE PRACTITIONER
Payer: MEDICARE

## 2022-12-20 DIAGNOSIS — G45.9 TRANSIENT CEREBRAL ISCHEMIC ATTACK, UNSPECIFIED: ICD-10-CM

## 2022-12-20 LAB
LEFT CCA DIST DIAS: 17 CM/S
LEFT CCA DIST SYS: 64.9 CM/S
LEFT CCA PROX DIAS: 19.2 CM/S
LEFT CCA PROX SYS: 84.4 CM/S
LEFT ECA DIAS: 8.13 CM/S
LEFT ECA SYS: 51.7 CM/S
LEFT ICA DIST DIAS: 22.3 CM/S
LEFT ICA DIST SYS: 62.3 CM/S
LEFT ICA MID DIAS: 23 CM/S
LEFT ICA MID SYS: 72.8 CM/S
LEFT ICA PROX DIAS: 10 CM/S
LEFT ICA PROX SYS: 44.2 CM/S
LEFT ICA/CCA SYS: 1.12 NO UNITS
LEFT VERTEBRAL DIAS: 13.69 CM/S
LEFT VERTEBRAL SYS: 40.1 CM/S
RIGHT CCA DIST DIAS: 16.8 CM/S
RIGHT CCA DIST SYS: 63.8 CM/S
RIGHT CCA PROX DIAS: 14.3 CM/S
RIGHT CCA PROX SYS: 56.5 CM/S
RIGHT ECA DIAS: 11.69 CM/S
RIGHT ECA SYS: 69.7 CM/S
RIGHT ICA DIST DIAS: 23.3 CM/S
RIGHT ICA DIST SYS: 59.9 CM/S
RIGHT ICA MID DIAS: 18.1 CM/S
RIGHT ICA MID SYS: 62.5 CM/S
RIGHT ICA PROX DIAS: 12.9 CM/S
RIGHT ICA PROX SYS: 48.1 CM/S
RIGHT ICA/CCA SYS: 1 NO UNITS
RIGHT VERTEBRAL DIAS: 10.3 CM/S
RIGHT VERTEBRAL SYS: 35.1 CM/S

## 2022-12-20 PROCEDURE — 93880 EXTRACRANIAL BILAT STUDY: CPT

## 2022-12-20 PROCEDURE — 70450 CT HEAD/BRAIN W/O DYE: CPT

## 2023-05-19 RX ORDER — MULTIVIT WITH MINERALS/LUTEIN
1000 TABLET ORAL DAILY
COMMUNITY

## 2023-05-19 RX ORDER — LISINOPRIL AND HYDROCHLOROTHIAZIDE 12.5; 1 MG/1; MG/1
1 TABLET ORAL DAILY
COMMUNITY

## 2023-05-19 RX ORDER — CETIRIZINE HYDROCHLORIDE 10 MG/1
10 TABLET ORAL DAILY
COMMUNITY

## 2023-05-19 RX ORDER — CYCLOBENZAPRINE HCL 10 MG
10 TABLET ORAL 3 TIMES DAILY PRN
COMMUNITY

## 2023-05-19 RX ORDER — ALENDRONATE SODIUM 70 MG/1
70 TABLET ORAL
COMMUNITY

## 2023-05-19 RX ORDER — ERGOCALCIFEROL 1.25 MG/1
50000 CAPSULE ORAL
COMMUNITY

## 2023-05-19 RX ORDER — MONTELUKAST SODIUM 10 MG/1
10 TABLET ORAL DAILY
COMMUNITY

## 2023-05-19 RX ORDER — OMEPRAZOLE 20 MG/1
20 TABLET, DELAYED RELEASE ORAL DAILY
COMMUNITY

## 2023-08-29 ENCOUNTER — TRANSCRIBE ORDERS (OUTPATIENT)
Facility: HOSPITAL | Age: 73
End: 2023-08-29

## 2023-08-29 DIAGNOSIS — Z12.31 ENCOUNTER FOR SCREENING MAMMOGRAM FOR MALIGNANT NEOPLASM OF BREAST: Primary | ICD-10-CM

## 2023-08-29 DIAGNOSIS — M81.0 AGE-RELATED OSTEOPOROSIS WITHOUT CURRENT PATHOLOGICAL FRACTURE: Primary | ICD-10-CM

## 2023-10-20 ENCOUNTER — HOSPITAL ENCOUNTER (OUTPATIENT)
Facility: HOSPITAL | Age: 73
End: 2023-10-20
Payer: MEDICARE

## 2023-10-20 VITALS — HEIGHT: 62 IN | WEIGHT: 158 LBS | BODY MASS INDEX: 29.08 KG/M2

## 2023-10-20 DIAGNOSIS — Z12.31 ENCOUNTER FOR SCREENING MAMMOGRAM FOR MALIGNANT NEOPLASM OF BREAST: ICD-10-CM

## 2023-10-20 DIAGNOSIS — M81.0 AGE-RELATED OSTEOPOROSIS WITHOUT CURRENT PATHOLOGICAL FRACTURE: ICD-10-CM

## 2023-10-20 PROCEDURE — 77080 DXA BONE DENSITY AXIAL: CPT

## 2023-10-20 PROCEDURE — 77063 BREAST TOMOSYNTHESIS BI: CPT

## 2024-03-11 ENCOUNTER — HOSPITAL ENCOUNTER (OUTPATIENT)
Facility: HOSPITAL | Age: 74
Discharge: HOME | End: 2024-03-14
Payer: MEDICARE

## 2024-03-11 ENCOUNTER — TRANSCRIBE ORDERS (OUTPATIENT)
Facility: HOSPITAL | Age: 74
End: 2024-03-11

## 2024-03-11 DIAGNOSIS — J40 BRONCHITIS, NOT SPECIFIED AS ACUTE OR CHRONIC: ICD-10-CM

## 2024-03-11 DIAGNOSIS — J40 BRONCHITIS, NOT SPECIFIED AS ACUTE OR CHRONIC: Primary | ICD-10-CM

## 2024-03-11 PROCEDURE — 71046 X-RAY EXAM CHEST 2 VIEWS: CPT

## 2024-04-24 ENCOUNTER — APPOINTMENT (OUTPATIENT)
Facility: HOSPITAL | Age: 74
End: 2024-04-24
Payer: MEDICARE

## 2024-04-24 ENCOUNTER — HOSPITAL ENCOUNTER (EMERGENCY)
Facility: HOSPITAL | Age: 74
Discharge: HOME | End: 2024-04-24
Attending: EMERGENCY MEDICINE
Payer: MEDICARE

## 2024-04-24 VITALS
HEART RATE: 57 BPM | RESPIRATION RATE: 17 BRPM | TEMPERATURE: 97.9 F | OXYGEN SATURATION: 93 % | BODY MASS INDEX: 29.08 KG/M2 | SYSTOLIC BLOOD PRESSURE: 126 MMHG | DIASTOLIC BLOOD PRESSURE: 72 MMHG | HEIGHT: 62 IN | WEIGHT: 158 LBS

## 2024-04-24 DIAGNOSIS — S83.92XA SPRAIN OF LEFT KNEE, UNSPECIFIED LIGAMENT, INITIAL ENCOUNTER: Primary | ICD-10-CM

## 2024-04-24 PROCEDURE — 73562 X-RAY EXAM OF KNEE 3: CPT

## 2024-04-24 PROCEDURE — 96372 THER/PROPH/DIAG INJ SC/IM: CPT

## 2024-04-24 PROCEDURE — 99284 EMERGENCY DEPT VISIT MOD MDM: CPT

## 2024-04-24 PROCEDURE — 6360000002 HC RX W HCPCS: Performed by: EMERGENCY MEDICINE

## 2024-04-24 RX ORDER — KETOROLAC TROMETHAMINE 30 MG/ML
30 INJECTION, SOLUTION INTRAMUSCULAR; INTRAVENOUS ONCE
Status: COMPLETED | OUTPATIENT
Start: 2024-04-24 | End: 2024-04-24

## 2024-04-24 RX ADMIN — KETOROLAC TROMETHAMINE 30 MG: 30 INJECTION, SOLUTION INTRAMUSCULAR at 16:47

## 2024-04-24 ASSESSMENT — PAIN SCALES - GENERAL
PAINLEVEL_OUTOF10: 8
PAINLEVEL_OUTOF10: 10
PAINLEVEL_OUTOF10: 5

## 2024-04-24 ASSESSMENT — PAIN DESCRIPTION - LOCATION
LOCATION: KNEE
LOCATION: KNEE

## 2024-04-24 ASSESSMENT — PAIN - FUNCTIONAL ASSESSMENT
PAIN_FUNCTIONAL_ASSESSMENT: NONE - DENIES PAIN
PAIN_FUNCTIONAL_ASSESSMENT: 0-10

## 2024-04-24 ASSESSMENT — PAIN DESCRIPTION - ORIENTATION: ORIENTATION: LEFT

## 2024-04-24 ASSESSMENT — LIFESTYLE VARIABLES
HOW OFTEN DO YOU HAVE A DRINK CONTAINING ALCOHOL: NEVER
HOW MANY STANDARD DRINKS CONTAINING ALCOHOL DO YOU HAVE ON A TYPICAL DAY: PATIENT DOES NOT DRINK

## 2024-04-24 ASSESSMENT — PAIN DESCRIPTION - DESCRIPTORS: DESCRIPTORS: SHOOTING

## 2024-04-24 NOTE — ED TRIAGE NOTES
Ambulatory patient reports exercising at home earlier today and heard a \"pop\" from her L knee. Patient reports 10/10 pain L knee and quad pain.   Tramadol taken at 11am PTA without relief.

## 2024-04-24 NOTE — ED PROVIDER NOTES
[facility] EMERGENCY DEP  EMERGENCY DEPARTMENT ENCOUNTER      Patient Name: Nan Florence  MRN: [id number]  Birthdate 1950  Date of Evaluation: 4/24/2024  Physician: Brad Moe MD    CHIEF COMPLAINT       Chief Complaint   Patient presents with    Knee Pain       HISTORY OF PRESENT ILLNESS   (Location/Symptom, Timing/Onset, Context/Setting, Quality, Duration, Modifying Factors, Severity)   Nan Florence, 73 y.o., female     73-year-old female with past medical history of osteoporosis, hypertension, and GERD presents to the ED with left knee pain that began this morning during exercising.  She states that while she was exercising she felt a pop in her left knee.  Reports being able to ambulate but that the pain increases.  She took a tramadol this morning which provided some relief.          Nursing Notes were reviewed.    REVIEW OF SYSTEMS    (Not required)   Review of Systems    Except as noted above the remainder of the review of systems was reviewed and negative.     PAST MEDICAL HISTORY     Past Medical History:   Diagnosis Date    GERD (gastroesophageal reflux disease)     Hypertension     Kidney stones     Menopause     Osteopenia     Osteoporosis     Spinal stenosis     Vitamin D deficiency        SURGICAL HISTORY       Past Surgical History:   Procedure Laterality Date    HYSTERECTOMY (CERVIX STATUS UNKNOWN)      LITHOTRIPSY      x 2    OVARY REMOVAL         CURRENT MEDICATIONS       Previous Medications    ALENDRONATE (FOSAMAX) 70 MG TABLET    Take 1 tablet by mouth every 7 days    CETIRIZINE (ZYRTEC) 10 MG TABLET    Take 1 tablet by mouth daily    CYCLOBENZAPRINE (FLEXERIL) 10 MG TABLET    Take 1 tablet by mouth 3 times daily as needed    ERGOCALCIFEROL (ERGOCALCIFEROL) 1.25 MG (47871 UT) CAPSULE    Take 1 capsule by mouth every 7 days    LISINOPRIL-HYDROCHLOROTHIAZIDE (PRINZIDE;ZESTORETIC) 10-12.5 MG PER TABLET    Take 1 tablet by mouth daily    MONTELUKAST (SINGULAIR) 10 MG TABLET    Take 1 tablet by

## 2024-10-16 ENCOUNTER — TRANSCRIBE ORDERS (OUTPATIENT)
Facility: HOSPITAL | Age: 74
End: 2024-10-16

## 2024-10-16 DIAGNOSIS — Z12.31 SCREENING MAMMOGRAM FOR BREAST CANCER: Primary | ICD-10-CM

## 2024-11-26 ENCOUNTER — APPOINTMENT (OUTPATIENT)
Facility: HOSPITAL | Age: 74
End: 2024-11-26
Payer: MEDICARE

## 2024-11-26 ENCOUNTER — HOSPITAL ENCOUNTER (EMERGENCY)
Facility: HOSPITAL | Age: 74
Discharge: HOME | End: 2024-11-26
Attending: EMERGENCY MEDICINE
Payer: MEDICARE

## 2024-11-26 VITALS
DIASTOLIC BLOOD PRESSURE: 71 MMHG | HEIGHT: 62 IN | TEMPERATURE: 98.2 F | HEART RATE: 78 BPM | OXYGEN SATURATION: 98 % | SYSTOLIC BLOOD PRESSURE: 120 MMHG | WEIGHT: 155 LBS | BODY MASS INDEX: 28.52 KG/M2 | RESPIRATION RATE: 16 BRPM

## 2024-11-26 DIAGNOSIS — S01.01XA LACERATION OF SCALP, INITIAL ENCOUNTER: ICD-10-CM

## 2024-11-26 DIAGNOSIS — S09.90XA CLOSED HEAD INJURY, INITIAL ENCOUNTER: Primary | ICD-10-CM

## 2024-11-26 PROCEDURE — 70450 CT HEAD/BRAIN W/O DYE: CPT

## 2024-11-26 PROCEDURE — 99284 EMERGENCY DEPT VISIT MOD MDM: CPT

## 2024-11-26 PROCEDURE — 12001 RPR S/N/AX/GEN/TRNK 2.5CM/<: CPT

## 2024-11-26 PROCEDURE — 6370000000 HC RX 637 (ALT 250 FOR IP): Performed by: EMERGENCY MEDICINE

## 2024-11-26 RX ADMIN — Medication 3 ML: at 14:08

## 2024-11-26 NOTE — ED PROVIDER NOTES
[facility] EMERGENCY DEP  EMERGENCY DEPARTMENT ENCOUNTER       Pt Name: Nan Florence  MRN: [id number]  Birthdate 1950  Date of evaluation: 11/26/2024  Provider: Adal Oviedo DO   PCP: Megan Robbins APRN - NP  Note Started: 1:22 PM EST 11/26/24     CHIEF COMPLAINT       Chief Complaint   Patient presents with    Head Injury        HISTORY OF PRESENT ILLNESS: 1 or more elements      History From: Patient, History limited by: none     Nan Florence is a 74 y.o. female presenting the emergency department after a fall and a head injury.  Tripped and fell down steps, hit the back of her head, no loss of consciousness.  Takes aspirin daily       Please See MDM for Additional Details of the HPI/PMH  Nursing Notes were all reviewed and agreed with or any disagreements were addressed in the HPI.     REVIEW OF SYSTEMS        Positives and Pertinent negatives as per HPI.    PAST HISTORY     Past Medical History:  Past Medical History:   Diagnosis Date    GERD (gastroesophageal reflux disease)     Hypertension     Kidney stones     Menopause     Osteopenia     Osteoporosis     Spinal stenosis     Vitamin D deficiency        Past Surgical History:  Past Surgical History:   Procedure Laterality Date    HYSTERECTOMY (CERVIX STATUS UNKNOWN)      LITHOTRIPSY      x 2    OVARY REMOVAL         Family History:  Family History   Problem Relation Age of Onset    Diabetes Mother     Hypertension Sister     Heart Attack Brother     Stroke Brother     Heart Failure Mother     Hypertension Mother     COPD Mother     Heart Failure Father        Social History:  Social History     Tobacco Use    Smoking status: Never    Smokeless tobacco: Never   Substance Use Topics    Alcohol use: No    Drug use: No       Allergies:  Allergies   Allergen Reactions    Calcium Other (See Comments) and Swelling     \"my mouth swells up and bleeds\"    Copper Itching       CURRENT MEDICATIONS      Discharge Medication List as of 11/26/2024  2:44 PM

## 2024-11-26 NOTE — ED TRIAGE NOTES
Missed a step and fell backwards resulting in 1 cm lac to posterior head/scalp , denies LO C, denies neck or back pain

## 2024-12-03 ENCOUNTER — HOSPITAL ENCOUNTER (OUTPATIENT)
Facility: HOSPITAL | Age: 74
Discharge: HOME | End: 2024-12-06
Payer: MEDICARE

## 2024-12-03 DIAGNOSIS — Z12.31 SCREENING MAMMOGRAM FOR BREAST CANCER: ICD-10-CM

## 2024-12-03 PROCEDURE — 77063 BREAST TOMOSYNTHESIS BI: CPT

## 2024-12-13 ENCOUNTER — TRANSCRIBE ORDERS (OUTPATIENT)
Facility: HOSPITAL | Age: 74
End: 2024-12-13

## 2024-12-13 ENCOUNTER — HOSPITAL ENCOUNTER (OUTPATIENT)
Facility: HOSPITAL | Age: 74
Discharge: HOME | End: 2024-12-16
Payer: MEDICARE

## 2024-12-13 DIAGNOSIS — R10.84 GENERALIZED ABDOMINAL PAIN: ICD-10-CM

## 2024-12-13 DIAGNOSIS — R10.84 GENERALIZED ABDOMINAL PAIN: Primary | ICD-10-CM

## 2024-12-13 PROCEDURE — 74022 RADEX COMPL AQT ABD SERIES: CPT

## (undated) DEVICE — SUTURE VCRL SZ 2-0 L18IN ABSRB UD CT-1 L36MM 1/2 CIR J839D

## (undated) DEVICE — GOWN,SIRUS,NONRNF,SETINSLV,2XL,18/CS: Brand: MEDLINE

## (undated) DEVICE — HALTER TRACTION HD W/ TRI COTTON LINING FOAM LTX

## (undated) DEVICE — 3M™ TEGADERM™ TRANSPARENT FILM DRESSING FRAME STYLE, 1626W, 4 IN X 4-3/4 IN (10 CM X 12 CM), 50/CT 4CT/CASE: Brand: 3M™ TEGADERM™

## (undated) DEVICE — SNAP KOVER: Brand: UNBRANDED

## (undated) DEVICE — DRILL BIT 14MM

## (undated) DEVICE — APPLICATOR MEDICATED 10.5 CC SOLUTION HI LT ORNG CHLORAPREP

## (undated) DEVICE — SUTURE VCRL UD BR CT 3-0 18IN CT1 J838D

## (undated) DEVICE — GLOVE SURG SZ 85 L12IN FNGR THK79MIL GRN LTX FREE

## (undated) DEVICE — DRAPE,LAPAROTOMY,PCH,STERILE: Brand: MEDLINE

## (undated) DEVICE — 3.0MM PRECISION NEURO (MATCH HEAD)

## (undated) DEVICE — MAGNETIC INSTRUMENT PAD 10" X 16"; MEDIUM; DISPOSABLE: Brand: CARDINAL HEALTH

## (undated) DEVICE — STERILE POLYISOPRENE POWDER-FREE SURGICAL GLOVES: Brand: PROTEXIS

## (undated) DEVICE — AEGIS 1" DISK 4MM HOLE, PEEL OPEN: Brand: MEDLINE

## (undated) DEVICE — SPONGE GZ W4XL4IN COT 12 PLY TYP VII WVN C FLD DSGN

## (undated) DEVICE — Device

## (undated) DEVICE — LAMINECTOMY-MRMC: Brand: MEDLINE INDUSTRIES, INC.

## (undated) DEVICE — 450 ML BOTTLE OF 0.05% CHLORHEXIDINE GLUCONATE IN 99.95% STERILE WATER FOR IRRIGATION, USP AND APPLICATOR.: Brand: IRRISEPT ANTIMICROBIAL WOUND LAVAGE

## (undated) DEVICE — BLADE ES L4IN INSUL EDGE

## (undated) DEVICE — DRAIN SURG 10FR L1/8IN DIA3.2MM SIL CHN RND HUBLESS FULL

## (undated) DEVICE — SOLUTION IRRIG 1000ML 0.9% SOD CHL USP POUR PLAS BTL

## (undated) DEVICE — SYSTEM SKIN CLSR 22CM DERMBND PRINEO

## (undated) DEVICE — GLOVE SURG SZ 75 L12IN FNGR THK79MIL GRN LTX FREE

## (undated) DEVICE — SUTURE PERMAHAND SZ 2-0 L30IN NONABSORBABLE BLK SILK W/O A305H

## (undated) DEVICE — SUTURE STRATAFIX SZ 3-0 30CM NONABSORB UD 26MM FS 3/8 SXMP2B412

## (undated) DEVICE — CASPAR DISTR PIN12MMSTER: Brand: AESCULAP

## (undated) DEVICE — FLOSEAL HEMOSTATIC MATRIX, 5 ML: Brand: FLOSEAL

## (undated) DEVICE — SOLUTION IRRIG 1000ML STRL H2O USP PLAS POUR BTL

## (undated) DEVICE — SPONGE: SPECIALTY PEANUT XR 100/CS: Brand: MEDICAL ACTION INDUSTRIES

## (undated) DEVICE — GLOVE ORTHO 7 1/2   MSG9475